# Patient Record
Sex: FEMALE | Race: WHITE | NOT HISPANIC OR LATINO | Employment: STUDENT | URBAN - METROPOLITAN AREA
[De-identification: names, ages, dates, MRNs, and addresses within clinical notes are randomized per-mention and may not be internally consistent; named-entity substitution may affect disease eponyms.]

---

## 2023-04-03 DIAGNOSIS — N63.22 MASS OF UPPER INNER QUADRANT OF LEFT BREAST: ICD-10-CM

## 2023-04-03 DIAGNOSIS — Z12.39 ENCOUNTER FOR SCREENING FOR MALIGNANT NEOPLASM OF BREAST, UNSPECIFIED SCREENING MODALITY: ICD-10-CM

## 2023-04-03 DIAGNOSIS — D22.9 MELANOCYTIC NEVUS, UNSPECIFIED LOCATION: ICD-10-CM

## 2023-04-03 DIAGNOSIS — N63.32 UNSPECIFIED LUMP IN AXILLARY TAIL OF THE LEFT BREAST: Primary | ICD-10-CM

## 2023-04-12 ENCOUNTER — HOSPITAL ENCOUNTER (OUTPATIENT)
Dept: RADIOLOGY | Facility: HOSPITAL | Age: 58
Discharge: HOME | End: 2023-04-12
Payer: COMMERCIAL

## 2023-04-12 VITALS — BODY MASS INDEX: 26.83 KG/M2 | WEIGHT: 177 LBS | HEIGHT: 68 IN

## 2023-04-12 DIAGNOSIS — N63.22 MASS OF UPPER INNER QUADRANT OF LEFT BREAST: ICD-10-CM

## 2023-04-12 DIAGNOSIS — N63.32 UNSPECIFIED LUMP IN AXILLARY TAIL OF THE LEFT BREAST: ICD-10-CM

## 2023-04-12 DIAGNOSIS — D22.9 MELANOCYTIC NEVUS, UNSPECIFIED LOCATION: ICD-10-CM

## 2023-04-12 DIAGNOSIS — Z12.39 ENCOUNTER FOR SCREENING FOR MALIGNANT NEOPLASM OF BREAST, UNSPECIFIED SCREENING MODALITY: ICD-10-CM

## 2023-04-12 PROCEDURE — 77066 DX MAMMO INCL CAD BI: CPT | Mod: 26,,, | Performed by: RADIOLOGY

## 2023-04-12 PROCEDURE — 77062 BREAST TOMOSYNTHESIS BI: CPT | Mod: 26,,, | Performed by: RADIOLOGY

## 2023-04-12 PROCEDURE — 77062 MAMMO DIGITAL DIAGNOSTIC BILAT WITH TOMO: ICD-10-PCS | Mod: 26,,, | Performed by: RADIOLOGY

## 2023-04-12 PROCEDURE — 77062 BREAST TOMOSYNTHESIS BI: CPT | Mod: TC

## 2023-04-12 PROCEDURE — 77066 MAMMO DIGITAL DIAGNOSTIC BILAT WITH TOMO: ICD-10-PCS | Mod: 26,,, | Performed by: RADIOLOGY

## 2023-07-22 ENCOUNTER — HOSPITAL ENCOUNTER (OUTPATIENT)
Dept: RADIOLOGY | Facility: OTHER | Age: 58
Discharge: HOME | End: 2023-07-22
Attending: FAMILY MEDICINE
Payer: COMMERCIAL

## 2023-07-22 DIAGNOSIS — R10.2 PELVIC AND PERINEAL PAIN: ICD-10-CM

## 2023-07-22 PROCEDURE — 76830 US TRANSVAGINAL NON OB: ICD-10-PCS | Mod: 26,,, | Performed by: RADIOLOGY

## 2023-07-22 PROCEDURE — 76700 US EXAM ABDOM COMPLETE: CPT | Mod: 26,,, | Performed by: RADIOLOGY

## 2023-07-22 PROCEDURE — 76700 US ABDOMEN COMPLETE: ICD-10-PCS | Mod: 26,,, | Performed by: RADIOLOGY

## 2023-07-22 PROCEDURE — 76830 TRANSVAGINAL US NON-OB: CPT | Mod: 26,,, | Performed by: RADIOLOGY

## 2023-07-22 PROCEDURE — 76700 US EXAM ABDOM COMPLETE: CPT | Mod: TC

## 2023-07-22 PROCEDURE — 76830 TRANSVAGINAL US NON-OB: CPT | Mod: TC

## 2023-09-29 ENCOUNTER — OFFICE VISIT (OUTPATIENT)
Dept: OBSTETRICS AND GYNECOLOGY | Facility: CLINIC | Age: 58
End: 2023-09-29
Payer: COMMERCIAL

## 2023-09-29 VITALS
WEIGHT: 191.81 LBS | SYSTOLIC BLOOD PRESSURE: 154 MMHG | HEART RATE: 55 BPM | BODY MASS INDEX: 29.16 KG/M2 | DIASTOLIC BLOOD PRESSURE: 89 MMHG

## 2023-09-29 DIAGNOSIS — R93.89 THICKENED ENDOMETRIUM: Primary | ICD-10-CM

## 2023-09-29 LAB
B-HCG UR QL: NEGATIVE
CTP QC/QA: YES

## 2023-09-29 PROCEDURE — 88305 TISSUE EXAM BY PATHOLOGIST: CPT | Mod: 26,,, | Performed by: PATHOLOGY

## 2023-09-29 PROCEDURE — 99999 PR PBB SHADOW E&M-EST. PATIENT-LVL III: ICD-10-PCS | Mod: PBBFAC,,, | Performed by: OBSTETRICS & GYNECOLOGY

## 2023-09-29 PROCEDURE — 3079F DIAST BP 80-89 MM HG: CPT | Mod: CPTII,S$GLB,, | Performed by: OBSTETRICS & GYNECOLOGY

## 2023-09-29 PROCEDURE — 3008F BODY MASS INDEX DOCD: CPT | Mod: CPTII,S$GLB,, | Performed by: OBSTETRICS & GYNECOLOGY

## 2023-09-29 PROCEDURE — 58100 ENDOMETRIAL BIOPSY: ICD-10-PCS | Mod: S$GLB,,, | Performed by: OBSTETRICS & GYNECOLOGY

## 2023-09-29 PROCEDURE — 99999 PR PBB SHADOW E&M-EST. PATIENT-LVL III: CPT | Mod: PBBFAC,,, | Performed by: OBSTETRICS & GYNECOLOGY

## 2023-09-29 PROCEDURE — 1159F PR MEDICATION LIST DOCUMENTED IN MEDICAL RECORD: ICD-10-PCS | Mod: CPTII,S$GLB,, | Performed by: OBSTETRICS & GYNECOLOGY

## 2023-09-29 PROCEDURE — 58100 BIOPSY OF UTERUS LINING: CPT | Mod: S$GLB,,, | Performed by: OBSTETRICS & GYNECOLOGY

## 2023-09-29 PROCEDURE — 81025 POCT URINE PREGNANCY: ICD-10-PCS | Mod: S$GLB,,, | Performed by: OBSTETRICS & GYNECOLOGY

## 2023-09-29 PROCEDURE — 88305 TISSUE EXAM BY PATHOLOGIST: ICD-10-PCS | Mod: 26,,, | Performed by: PATHOLOGY

## 2023-09-29 PROCEDURE — 3077F PR MOST RECENT SYSTOLIC BLOOD PRESSURE >= 140 MM HG: ICD-10-PCS | Mod: CPTII,S$GLB,, | Performed by: OBSTETRICS & GYNECOLOGY

## 2023-09-29 PROCEDURE — 1159F MED LIST DOCD IN RCRD: CPT | Mod: CPTII,S$GLB,, | Performed by: OBSTETRICS & GYNECOLOGY

## 2023-09-29 PROCEDURE — 99203 OFFICE O/P NEW LOW 30 MIN: CPT | Mod: 25,S$GLB,, | Performed by: OBSTETRICS & GYNECOLOGY

## 2023-09-29 PROCEDURE — 88305 TISSUE EXAM BY PATHOLOGIST: CPT | Performed by: PATHOLOGY

## 2023-09-29 PROCEDURE — 81025 URINE PREGNANCY TEST: CPT | Mod: S$GLB,,, | Performed by: OBSTETRICS & GYNECOLOGY

## 2023-09-29 PROCEDURE — 3077F SYST BP >= 140 MM HG: CPT | Mod: CPTII,S$GLB,, | Performed by: OBSTETRICS & GYNECOLOGY

## 2023-09-29 PROCEDURE — 99203 PR OFFICE/OUTPT VISIT, NEW, LEVL III, 30-44 MIN: ICD-10-PCS | Mod: 25,S$GLB,, | Performed by: OBSTETRICS & GYNECOLOGY

## 2023-09-29 PROCEDURE — 3008F PR BODY MASS INDEX (BMI) DOCUMENTED: ICD-10-PCS | Mod: CPTII,S$GLB,, | Performed by: OBSTETRICS & GYNECOLOGY

## 2023-09-29 PROCEDURE — 3079F PR MOST RECENT DIASTOLIC BLOOD PRESSURE 80-89 MM HG: ICD-10-PCS | Mod: CPTII,S$GLB,, | Performed by: OBSTETRICS & GYNECOLOGY

## 2023-09-29 NOTE — PROCEDURES
Endometrial biopsy    Date/Time: 9/29/2023 1:45 PM    Performed by: Radha Becker MD  Authorized by: Radha Becker MD    Consent:     Consent obtained:  Written    Consent given by:  Patient    Patient questions answered: yes      Patient agrees, verbalizes understanding, and wants to proceed: yes    Indication:     Indications: Other disorder of menstruation and other abnormal bleeding from female genital tract    Pre-procedure:     Pre-procedure timeout performed: yes    Procedure:     Procedure: endometrial biopsy with Pipelle      Cervix cleaned and prepped: yes      The cervix was dilated: yes      Uterus sounded: yes      Uterus sound depth (cm):  6    Specimen collected: specimen collected and sent to pathology      Specimen collected comment:  Concern for insufficient sample      Radha Becker

## 2023-09-29 NOTE — PROGRESS NOTES
Gynecology    SUBJECTIVE:     Chief Complaint: Follow-up       History of Present Illness:  58 year old who presents for follow up of an ultrasound showing a thickened endometrial stripe.  She was having abdominal bloating and discomfort.  She had not had any vaginal bleeding.  Her bloating and discomfort has resolved since then, but when she was complaining about it, her primary care doctor ordered an abdominal and a pelvic ultrasound.  The ovaries were normal, but the EM stripe was 10 mm.  She is not and was not constipated when she had the discomfort.  She had a colonoscopy last year.     Review of Systems:  Review of Systems   Constitutional:  Negative for appetite change, fever and unexpected weight change.   Gastrointestinal:  Negative for abdominal pain, bloating, constipation, diarrhea, nausea and vomiting.   Genitourinary:  Negative for menorrhagia, menstrual problem, pelvic pain, vaginal bleeding, vaginal discharge, vaginal pain and vaginal odor.        OBJECTIVE:     Physical Exam:  Physical Exam  Vitals and nursing note reviewed.   Constitutional:       Appearance: She is well-developed.   Pulmonary:      Effort: Pulmonary effort is normal.   Abdominal:      Palpations: Abdomen is soft.   Genitourinary:     Labia:         Right: No rash, tenderness, lesion or injury.         Left: No rash, tenderness, lesion or injury.       Vagina: Normal. No signs of injury and foreign body. No vaginal discharge, erythema, tenderness or bleeding.      Cervix: No cervical motion tenderness, discharge or friability.      Uterus: Not deviated, not enlarged, not fixed and not tender.       Adnexa:         Right: No mass, tenderness or fullness.          Left: No mass, tenderness or fullness.        Comments: Urethra: normal appearing urethra with no masses, tenderness or lesions  Urethral meatus: normal size, anterior vaginal wall with no prolapse, no lesions  Neurological:      Mental Status: She is alert and oriented to  person, place, and time.   Psychiatric:         Behavior: Behavior normal.         Thought Content: Thought content normal.         Judgment: Judgment normal.         Chaperoned by: Valerie    ASSESSMENT:       ICD-10-CM ICD-9-CM    1. Thickened endometrium  R93.89 793.5              Plan:      Morro was seen today for follow-up.    Diagnoses and all orders for this visit:    Thickened endometrium    - ultrasound demonstrating incidental finding of thickened endometrium (10 mm); patient is otherwise asymptomatic currently  - uterine biopsy performed today    No orders of the defined types were placed in this encounter.        Radha Becker

## 2023-10-03 LAB
FINAL PATHOLOGIC DIAGNOSIS: NORMAL
GROSS: NORMAL
Lab: NORMAL

## 2024-03-25 ENCOUNTER — OFFICE VISIT (OUTPATIENT)
Dept: CARDIOLOGY | Facility: CLINIC | Age: 59
End: 2024-03-25
Payer: COMMERCIAL

## 2024-03-25 VITALS
BODY MASS INDEX: 30.41 KG/M2 | WEIGHT: 200 LBS | SYSTOLIC BLOOD PRESSURE: 122 MMHG | HEART RATE: 61 BPM | DIASTOLIC BLOOD PRESSURE: 82 MMHG | OXYGEN SATURATION: 98 %

## 2024-03-25 DIAGNOSIS — R07.2 PRECORDIAL PAIN: Primary | ICD-10-CM

## 2024-03-25 PROCEDURE — 3008F BODY MASS INDEX DOCD: CPT | Mod: CPTII,S$GLB,, | Performed by: INTERNAL MEDICINE

## 2024-03-25 PROCEDURE — 93005 ELECTROCARDIOGRAM TRACING: CPT

## 2024-03-25 PROCEDURE — 93010 ELECTROCARDIOGRAM REPORT: CPT | Mod: S$GLB,,, | Performed by: INTERNAL MEDICINE

## 2024-03-25 PROCEDURE — 99999 PR PBB SHADOW E&M-EST. PATIENT-LVL III: CPT | Mod: PBBFAC,,, | Performed by: INTERNAL MEDICINE

## 2024-03-25 PROCEDURE — 1160F RVW MEDS BY RX/DR IN RCRD: CPT | Mod: CPTII,S$GLB,, | Performed by: INTERNAL MEDICINE

## 2024-03-25 PROCEDURE — 99204 OFFICE O/P NEW MOD 45 MIN: CPT | Mod: 25,S$GLB,, | Performed by: INTERNAL MEDICINE

## 2024-03-25 PROCEDURE — 3074F SYST BP LT 130 MM HG: CPT | Mod: CPTII,S$GLB,, | Performed by: INTERNAL MEDICINE

## 2024-03-25 PROCEDURE — 3079F DIAST BP 80-89 MM HG: CPT | Mod: CPTII,S$GLB,, | Performed by: INTERNAL MEDICINE

## 2024-03-25 PROCEDURE — 1159F MED LIST DOCD IN RCRD: CPT | Mod: CPTII,S$GLB,, | Performed by: INTERNAL MEDICINE

## 2024-03-25 NOTE — PROGRESS NOTES
[facility]    Chief Complaint  Chief Complaint   Patient presents with    Chest Pain       HPI:    Presents for evaluation of chest pain.  First occurred about 2 weeks ago while she was exercising.  Prior to that, typically exercise without any limitations.  That day she developed a left precordial ache.  Radiated to the left side of her neck into her left axilla and left scapular area.  Felt lightheaded.  No nausea, vomiting, or diaphoresis.  Since then the discomfort has persisted.  It waxes and wanes.  Gets worse with activities.  Has not exercise since that time.    Medications  No current outpatient medications on file.     No current facility-administered medications for this visit.        History  History reviewed. No pertinent past medical history.  Past Surgical History:   Procedure Laterality Date    CERVICAL BIOPSY  W/ LOOP ELECTRODE EXCISION  2019     Social History     Socioeconomic History    Marital status: Significant Other   Tobacco Use    Smoking status: Former     Types: Cigarettes     Passive exposure: Never    Smokeless tobacco: Never   Substance and Sexual Activity    Alcohol use: Yes     Social Determinants of Health     Financial Resource Strain: Low Risk  (3/22/2024)    Overall Financial Resource Strain (CARDIA)     Difficulty of Paying Living Expenses: Not hard at all   Food Insecurity: No Food Insecurity (3/22/2024)    Hunger Vital Sign     Worried About Running Out of Food in the Last Year: Never true     Ran Out of Food in the Last Year: Never true   Transportation Needs: No Transportation Needs (3/22/2024)    PRAPARE - Transportation     Lack of Transportation (Medical): No     Lack of Transportation (Non-Medical): No   Physical Activity: Sufficiently Active (3/22/2024)    Exercise Vital Sign     Days of Exercise per Week: 7 days     Minutes of Exercise per Session: 30 min   Stress: Stress Concern Present (3/22/2024)    [place] [employer] -  Occupational Stress Questionnaire     Feeling of Stress : To some extent   Social Connections: Unknown (3/22/2024)    Social Connection and Isolation Panel [NHANES]     Frequency of Communication with Friends and Family: Once a week     Frequency of Social Gatherings with Friends and Family: Twice a week     Active Member of Clubs or Organizations: No     Attends Club or Organization Meetings: Patient declined     Marital Status:    Housing Stability: Low Risk  (3/22/2024)    Housing Stability Vital Sign     Unable to Pay for Housing in the Last Year: No     Number of Places Lived in the Last Year: 1     Unstable Housing in the Last Year: No     Family History   Problem Relation Age of Onset    Atrial fibrillation Mother         Allergies  Review of patient's allergies indicates:  No Known Allergies    Review of Systems   Review of Systems   Constitutional: Negative for malaise/fatigue, weight gain and weight loss.   Eyes:  Negative for visual disturbance.   Cardiovascular:  Positive for chest pain. Negative for claudication, cyanosis, dyspnea on exertion, irregular heartbeat, leg swelling, near-syncope, orthopnea, palpitations, paroxysmal nocturnal dyspnea and syncope.   Respiratory:  Negative for cough, hemoptysis, shortness of breath, sleep disturbances due to breathing and wheezing.    Hematologic/Lymphatic: Negative for bleeding problem. Does not bruise/bleed easily.   Skin:  Negative for poor wound healing.   Musculoskeletal:  Negative for muscle cramps and myalgias.   Gastrointestinal:  Negative for abdominal pain, anorexia, diarrhea, heartburn, hematemesis, hematochezia, melena, nausea and vomiting.   Genitourinary:  Negative for hematuria and nocturia.   Neurological:  Negative for excessive daytime sleepiness, dizziness, focal weakness, light-headedness and weakness.       Physical Exam  Vitals:    03/25/24 0930   BP: 122/82   Pulse: 61     Wt Readings from Last 1 Encounters:   03/25/24 90.7 kg  (200 lb)     Physical Exam  Vitals and nursing note reviewed.   Constitutional:       General: She is not in acute distress.     Appearance: She is not toxic-appearing or diaphoretic.   HENT:      Head: Normocephalic and atraumatic.      Mouth/Throat:      Lips: Pink.      Mouth: Mucous membranes are moist.   Eyes:      General: No scleral icterus.     Conjunctiva/sclera: Conjunctivae normal.   Neck:      Thyroid: No thyromegaly.      Vascular: No carotid bruit, hepatojugular reflux or JVD.      Trachea: Trachea normal.   Cardiovascular:      Rate and Rhythm: Normal rate and regular rhythm. No extrasystoles are present.     Chest Wall: PMI is not displaced.      Pulses:           Carotid pulses are 2+ on the right side and 2+ on the left side.       Radial pulses are 2+ on the right side and 2+ on the left side.        Dorsalis pedis pulses are 2+ on the right side and 2+ on the left side.        Posterior tibial pulses are 2+ on the right side and 2+ on the left side.      Heart sounds: S1 normal and S2 normal. No murmur heard.     No friction rub. No S3 or S4 sounds.   Pulmonary:      Effort: Pulmonary effort is normal. No accessory muscle usage or respiratory distress.      Breath sounds: Normal breath sounds and air entry. No decreased breath sounds, wheezing, rhonchi or rales.   Abdominal:      General: Bowel sounds are normal. There is no distension or abdominal bruit.      Palpations: Abdomen is soft. There is no hepatomegaly, splenomegaly or pulsatile mass.      Tenderness: There is no abdominal tenderness.   Musculoskeletal:         General: No tenderness or deformity.      Right lower leg: No edema.      Left lower leg: No edema.   Skin:     General: Skin is warm and dry.      Capillary Refill: Capillary refill takes less than 2 seconds.      Coloration: Skin is not cyanotic or pale.      Nails: There is no clubbing.   Neurological:      General: No focal deficit present.      Mental Status: She is alert  and oriented to person, place, and time.   Psychiatric:         Attention and Perception: Attention normal.         Mood and Affect: Mood normal.         Speech: Speech normal.         Behavior: Behavior normal. Behavior is cooperative.         Labs  Office Visit on 09/29/2023   Component Date Value Ref Range Status    POC Preg Test, Ur 09/29/2023 Negative  Negative Final     Acceptable 09/29/2023 Yes   Final    Final Pathologic Diagnosis 09/29/2023 Small fragments of inactive endometrium admixed with blood clot and mucus, no atypical hyperplasia or malignancy identified.   Final    Interp By Eran Alvares M.D., Signed on 10/03/2023 at 12:49    Gross 09/29/2023    Final                    Value:Pathology ID:  [id number]  Patient ID:  [id number]     The specimen is received in formalin labeled &quot;endometrium&quot; per requisition.  The specimen consists of multiple tan-white fragments of soft tissue admixed with semitranslucent, tan, mucoid material measuring 0.7 x 0.6 x 0.4 cm in aggregate.  The   specimen is submitted entirely in cassette [id number].    Lou Peterson, MS  Grossing Technologist      Disclaimer 09/29/2023 Unless the case is a 'gross only' or additional testing only, the final diagnosis for each specimen is based on a microscopic examination of appropriate tissue sections.   Final       Imaging  No results found.    Assessment  1. Precordial pain  Exertional component makes it concerning for cardiac etiology.  - IN OFFICE EKG 12-LEAD (to Muse)  - Exercise Stress - EKG; Future      Plan and Discussion    Workup as above with further planning based on those results.    The ASCVD Risk score (Hardik DK, et al., 2019) failed to calculate for the following reasons:    Cannot find a previous HDL lab    Cannot find a previous total cholesterol lab     Follow Up  Follow up for test results.      Javad Moya MD

## 2024-03-26 ENCOUNTER — HOSPITAL ENCOUNTER (OUTPATIENT)
Dept: CARDIOLOGY | Facility: OTHER | Age: 59
Discharge: HOME | End: 2024-03-26
Attending: INTERNAL MEDICINE
Payer: COMMERCIAL

## 2024-03-26 VITALS
HEIGHT: 68 IN | WEIGHT: 140 LBS | SYSTOLIC BLOOD PRESSURE: 115 MMHG | BODY MASS INDEX: 21.22 KG/M2 | DIASTOLIC BLOOD PRESSURE: 67 MMHG | HEART RATE: 57 BPM

## 2024-03-26 DIAGNOSIS — R07.2 PRECORDIAL PAIN: ICD-10-CM

## 2024-03-26 LAB
CV STRESS BASE HR: 57 BPM
DIASTOLIC BLOOD PRESSURE: 67 MMHG
OHS CV CPX 85 PERCENT MAX PREDICTED HEART RATE MALE: 138
OHS CV CPX ESTIMATED METS: 13
OHS CV CPX MAX PREDICTED HEART RATE: 162
OHS CV CPX PATIENT IS FEMALE: 1
OHS CV CPX PATIENT IS MALE: 0
OHS CV CPX PEAK DIASTOLIC BLOOD PRESSURE: 78 MMHG
OHS CV CPX PEAK HEAR RATE: 155 BPM
OHS CV CPX PEAK RATE PRESSURE PRODUCT: NORMAL
OHS CV CPX PEAK SYSTOLIC BLOOD PRESSURE: 202 MMHG
OHS CV CPX PERCENT MAX PREDICTED HEART RATE ACHIEVED: 100
OHS CV CPX RATE PRESSURE PRODUCT PRESENTING: 6555
OHS QRS DURATION: 90 MS
OHS QTC CALCULATION: 392 MS
STRESS ECHO POST EXERCISE DUR MIN: 9 MINUTES
STRESS ECHO POST EXERCISE DUR SEC: 59 SECONDS
SYSTOLIC BLOOD PRESSURE: 115 MMHG

## 2024-03-26 PROCEDURE — 93018 CV STRESS TEST I&R ONLY: CPT | Mod: ,,, | Performed by: INTERNAL MEDICINE

## 2024-03-26 PROCEDURE — 93016 CV STRESS TEST SUPVJ ONLY: CPT | Mod: ,,, | Performed by: INTERNAL MEDICINE

## 2024-03-26 PROCEDURE — 93017 CV STRESS TEST TRACING ONLY: CPT

## 2024-04-16 ENCOUNTER — HOSPITAL ENCOUNTER (OUTPATIENT)
Dept: RADIOLOGY | Facility: OTHER | Age: 59
Discharge: HOME | End: 2024-04-16
Attending: FAMILY MEDICINE
Payer: COMMERCIAL

## 2024-04-16 DIAGNOSIS — Z12.31 ENCOUNTER FOR SCREENING MAMMOGRAM FOR MALIGNANT NEOPLASM OF BREAST: ICD-10-CM

## 2024-04-16 PROCEDURE — 77067 SCR MAMMO BI INCL CAD: CPT | Mod: TC

## 2024-04-16 PROCEDURE — 77063 BREAST TOMOSYNTHESIS BI: CPT | Mod: TC

## 2024-04-16 PROCEDURE — 77067 SCR MAMMO BI INCL CAD: CPT | Mod: 26,,, | Performed by: RADIOLOGY

## 2024-04-16 PROCEDURE — 77063 BREAST TOMOSYNTHESIS BI: CPT | Mod: 26,,, | Performed by: RADIOLOGY

## 2025-05-06 ENCOUNTER — PATIENT OUTREACH (OUTPATIENT)
Dept: ADMINISTRATIVE | Facility: HOSPITAL | Age: 60
End: 2025-05-06
Payer: COMMERCIAL

## 2025-05-07 ENCOUNTER — PATIENT MESSAGE (OUTPATIENT)
Dept: INTERNAL MEDICINE | Facility: CLINIC | Age: 60
End: 2025-05-07

## 2025-05-07 ENCOUNTER — OFFICE VISIT (OUTPATIENT)
Dept: INTERNAL MEDICINE | Facility: CLINIC | Age: 60
End: 2025-05-07
Payer: COMMERCIAL

## 2025-05-07 ENCOUNTER — LAB VISIT (OUTPATIENT)
Dept: LAB | Facility: OTHER | Age: 60
End: 2025-05-07
Attending: FAMILY MEDICINE
Payer: COMMERCIAL

## 2025-05-07 ENCOUNTER — TELEPHONE (OUTPATIENT)
Dept: INTERNAL MEDICINE | Facility: CLINIC | Age: 60
End: 2025-05-07

## 2025-05-07 ENCOUNTER — RESULTS FOLLOW-UP (OUTPATIENT)
Dept: INTERNAL MEDICINE | Facility: CLINIC | Age: 60
End: 2025-05-07

## 2025-05-07 VITALS
BODY MASS INDEX: 30.3 KG/M2 | HEART RATE: 64 BPM | SYSTOLIC BLOOD PRESSURE: 120 MMHG | DIASTOLIC BLOOD PRESSURE: 70 MMHG | HEIGHT: 68 IN | OXYGEN SATURATION: 99 % | WEIGHT: 199.94 LBS

## 2025-05-07 DIAGNOSIS — Z00.00 ANNUAL PHYSICAL EXAM: ICD-10-CM

## 2025-05-07 DIAGNOSIS — D22.9 MULTIPLE ATYPICAL SKIN MOLES: ICD-10-CM

## 2025-05-07 DIAGNOSIS — N95.1 MENOPAUSAL STATE: ICD-10-CM

## 2025-05-07 DIAGNOSIS — Z12.31 ENCOUNTER FOR SCREENING MAMMOGRAM FOR MALIGNANT NEOPLASM OF BREAST: ICD-10-CM

## 2025-05-07 DIAGNOSIS — Z00.00 ANNUAL PHYSICAL EXAM: Primary | ICD-10-CM

## 2025-05-07 DIAGNOSIS — F41.1 GENERALIZED ANXIETY DISORDER: ICD-10-CM

## 2025-05-07 LAB
ABSOLUTE EOSINOPHIL (OHS): 0.14 K/UL
ABSOLUTE MONOCYTE (OHS): 0.79 K/UL (ref 0.3–1)
ABSOLUTE NEUTROPHIL COUNT (OHS): 1.42 K/UL (ref 1.8–7.7)
ALBUMIN SERPL BCP-MCNC: 3.9 G/DL (ref 3.5–5.2)
ALP SERPL-CCNC: 54 UNIT/L (ref 40–150)
ALT SERPL W/O P-5'-P-CCNC: 43 UNIT/L (ref 10–44)
ANION GAP (OHS): 11 MMOL/L (ref 8–16)
AST SERPL-CCNC: 34 UNIT/L (ref 11–45)
BASOPHILS # BLD AUTO: 0.06 K/UL
BASOPHILS NFR BLD AUTO: 1.4 %
BILIRUB SERPL-MCNC: 0.3 MG/DL (ref 0.1–1)
BUN SERPL-MCNC: 16 MG/DL (ref 6–20)
CALCIUM SERPL-MCNC: 9.9 MG/DL (ref 8.7–10.5)
CHLORIDE SERPL-SCNC: 104 MMOL/L (ref 95–110)
CHOLEST SERPL-MCNC: 272 MG/DL (ref 120–199)
CHOLEST/HDLC SERPL: 4 {RATIO} (ref 2–5)
CO2 SERPL-SCNC: 26 MMOL/L (ref 23–29)
CREAT SERPL-MCNC: 0.8 MG/DL (ref 0.5–1.4)
EAG (OHS): 105 MG/DL (ref 68–131)
ERYTHROCYTE [DISTWIDTH] IN BLOOD BY AUTOMATED COUNT: 12.5 % (ref 11.5–14.5)
GFR SERPLBLD CREATININE-BSD FMLA CKD-EPI: >60 ML/MIN/1.73/M2
GLUCOSE SERPL-MCNC: 99 MG/DL (ref 70–110)
HBA1C MFR BLD: 5.3 % (ref 4–5.6)
HCT VFR BLD AUTO: 42.7 % (ref 37–48.5)
HDLC SERPL-MCNC: 68 MG/DL (ref 40–75)
HDLC SERPL: 25 % (ref 20–50)
HGB BLD-MCNC: 14.1 GM/DL (ref 12–16)
IMM GRANULOCYTES # BLD AUTO: 0.01 K/UL (ref 0–0.04)
IMM GRANULOCYTES NFR BLD AUTO: 0.2 % (ref 0–0.5)
LDLC SERPL CALC-MCNC: 176.8 MG/DL (ref 63–159)
LYMPHOCYTES # BLD AUTO: 1.92 K/UL (ref 1–4.8)
MCH RBC QN AUTO: 31.8 PG (ref 27–31)
MCHC RBC AUTO-ENTMCNC: 33 G/DL (ref 32–36)
MCV RBC AUTO: 96 FL (ref 82–98)
NONHDLC SERPL-MCNC: 204 MG/DL
NUCLEATED RBC (/100WBC) (OHS): 0 /100 WBC
PLATELET # BLD AUTO: 290 K/UL (ref 150–450)
PMV BLD AUTO: 8.8 FL (ref 9.2–12.9)
POTASSIUM SERPL-SCNC: 4.7 MMOL/L (ref 3.5–5.1)
PROT SERPL-MCNC: 7.9 GM/DL (ref 6–8.4)
RBC # BLD AUTO: 4.43 M/UL (ref 4–5.4)
RELATIVE EOSINOPHIL (OHS): 3.2 %
RELATIVE LYMPHOCYTE (OHS): 44.2 % (ref 18–48)
RELATIVE MONOCYTE (OHS): 18.2 % (ref 4–15)
RELATIVE NEUTROPHIL (OHS): 32.8 % (ref 38–73)
SODIUM SERPL-SCNC: 141 MMOL/L (ref 136–145)
T4 FREE SERPL-MCNC: 0.88 NG/DL (ref 0.71–1.51)
T4 FREE SERPL-MCNC: 0.88 NG/DL (ref 0.71–1.51)
TRIGL SERPL-MCNC: 136 MG/DL (ref 30–150)
TSH SERPL-ACNC: 3.62 UIU/ML (ref 0.4–4)
WBC # BLD AUTO: 4.34 K/UL (ref 3.9–12.7)

## 2025-05-07 PROCEDURE — 84443 ASSAY THYROID STIM HORMONE: CPT

## 2025-05-07 PROCEDURE — 83036 HEMOGLOBIN GLYCOSYLATED A1C: CPT

## 2025-05-07 PROCEDURE — 99999 PR PBB SHADOW E&M-EST. PATIENT-LVL IV: CPT | Mod: PBBFAC,,, | Performed by: FAMILY MEDICINE

## 2025-05-07 PROCEDURE — 85025 COMPLETE CBC W/AUTO DIFF WBC: CPT

## 2025-05-07 PROCEDURE — 80053 COMPREHEN METABOLIC PANEL: CPT

## 2025-05-07 PROCEDURE — 36415 COLL VENOUS BLD VENIPUNCTURE: CPT

## 2025-05-07 PROCEDURE — 80061 LIPID PANEL: CPT

## 2025-05-07 RX ORDER — LORAZEPAM 1 MG/1
1 TABLET ORAL EVERY 6 HOURS PRN
COMMUNITY

## 2025-05-07 RX ORDER — ESTRADIOL 0.1 MG/G
CREAM VAGINAL DAILY
COMMUNITY

## 2025-05-07 NOTE — PROGRESS NOTES
Subjective:      Patient ID: Morro Madrigal is a 59 y.o. female.    Chief Complaint: Est Care (Forehead need to be check )      History of Present Illness    CHIEF COMPLAINT:  - Ms. Madrigal presents for an annual wellness visit and physical exam.    HPI:  Ms. Madrigal reports using vaginal estrogen. She's looking to establish care with a gynecologist and asks for a referral.     She has taken one lorazepam in the last 6 months for anxiety and sleep, which she uses infrequently. Her anxiety is very manageable. She uses the Calm abhay for sleep, which is effective. She notes that she gets anxious while traveling. She's planning a trip to [place] in the next few months.    She currently has mild sinus congestion. She reports a healing scrape from a fire ant bite, which caused a significant reaction. She notes a persistent lump on her forehead, which she desires to have examined by a dermatologist.    She denies chest pain, trouble breathing, constipation, diarrhea, and persistent bleeding in stool. She denies having any allergies to medicines that she is aware of.    MEDICATIONS:  - Vaginal estrogen  - Lorazepam, used infrequently for anxiety and sleep (one dose in the last 6 months)    ALLERGIES:  - No known drug allergies    MEDICAL HISTORY:  - Depression: Past history, currently doing well  - Contraception: current vaginal estrogen  - History of abnormal Pap: Yes, two LEEPs previously    SURGICAL HISTORY:  - LEEP (Loop Electrosurgical Excision Procedure): Two procedures    FAMILY HISTORY:  - Mother: Atrial fibrillation, giant cell arteritis with vision loss in one eye  - Maternal aunt: Giant cell arteritis ()  - Father:  of lung cancer in his 60s, approximately 20 years ago  - Maternal grandfather:  of heart attack at age 62    SOCIAL HISTORY:  - Alcohol: Glass of wine nightly, cocktail or two once a week  Smoking: Former smoker, started at age 16 in , quit at age 26 in   Denies current drug use  -  "Occupation: Architectural historian, former          Problem List[1]     Current Medications[2]  Review of patient's allergies indicates:  No Known Allergies    Past Surgical History:   Procedure Laterality Date    CERVICAL BIOPSY  W/ LOOP ELECTRODE EXCISION  2019        Family History   Problem Relation Name Age of Onset    Atrial fibrillation Mother      Other (giant cell arteritis) Mother      Lung cancer Father      Other (giant cell arteritis) Maternal Aunt      Coronary artery disease Maternal Grandfather          Social History[3]     Lab Results   Component Value Date     05/07/2025    K 4.7 05/07/2025     05/07/2025    CO2 26 05/07/2025    GLU 99 05/07/2025    BUN 16 05/07/2025    CREATININE 0.8 05/07/2025    CALCIUM 9.9 05/07/2025    PROT 7.9 05/07/2025    ALBUMIN 3.9 05/07/2025    BILITOT 0.3 05/07/2025    ALKPHOS 54 05/07/2025    AST 34 05/07/2025    ALT 43 05/07/2025    ANIONGAP 11 05/07/2025    WBC 4.34 05/07/2025    HGB 14.1 05/07/2025    HCT 42.7 05/07/2025    MCV 96 05/07/2025     05/07/2025    TSH 3.619 05/07/2025       Lab Results   Component Value Date    HGBA1C 5.6 07/16/2024     No results found for: "MICALBCREAT"  No results found for: "LDLCALC", "CHOL", "HDL", "TRIG"    Review of Systems   Constitutional:  Negative for appetite change, chills, fever and unexpected weight change.   HENT:  Negative for ear pain, sinus pressure/congestion and sore throat.    Eyes:  Negative for visual disturbance.   Respiratory:  Negative for shortness of breath and wheezing.    Cardiovascular:  Negative for chest pain, palpitations and leg swelling.   Gastrointestinal:  Negative for abdominal pain, blood in stool, change in bowel habit, constipation, diarrhea and vomiting.   Genitourinary:  Negative for dysuria and hematuria.   Musculoskeletal:  Negative for arthralgias.   Integumentary:  Negative for rash.   Neurological:  Negative for dizziness and headaches.   Psychiatric/Behavioral: " " Negative for depressed mood and sleep disturbance.         Vitals:    05/07/25 1109   BP: 120/70   Pulse: 64   SpO2: 99%   Weight: 90.7 kg (199 lb 15.3 oz)   Height: 5' 8" (1.727 m)   PainSc: 0-No pain     Objective:   Physical Exam    General: Pleasant. No acute distress. Well-developed. Well-nourished.  Eyes: EOMBI. Sclerae anicteric.  HENT: Normocephalic. Atraumatic. Nares patent. Moist oral mucosa.  Cardiovascular: Regular rate and rhythm. No murmurs. No gallops. No rubs. Normal S1 and S2.  Respiratory: Normal respiratory effort. Clear to auscultation bilaterally. No rales. No rhonchi. No wheezing.  Abdomen: Soft. Nontender. Nondistended. Normoactive bowel sounds.  Musculoskeletal: No obvious deformity. Normal range of motion.  Extremities: No lower extremity edema.  Neurological: Alert and lucid. Normal gait. No gross deficits.  Psychiatric: Normal mood. Normal affect. Good insight. Good judgment.  Skin: Warm. Intact.        Assessment/Plan       ICD-10-CM ICD-9-CM   1. Annual physical exam  Z00.00 V70.0   2. Multiple atypical skin moles  D22.9 216.9   3. Generalized anxiety disorder  F41.1 300.02   4. Encounter for screening mammogram for malignant neoplasm of breast  Z12.31 V76.12   5. Menopausal state  N95.1 627.2        Assessment & Plan     Reviewed medical history, including family history of a-fib, giant cell arteritis, and early heart attack.   Noted history of LEEPs and past smoking, quit in 1991.   Depression history noted, patient reports currently doing well.   Examined patient, noting multiple moles.   Assessed for chest pain, breathing issues, or abdominal pain.   Noted healing fire ant bite and advised to avoid picking at it.   Recommend annual mammograms due to dense breast tissue.    PHYSICAL:  - Physical performed. Obtain labs as noted. Discussed age appropriate health screening and immunizations.    ANXIETY DISORDER:  - Continue lorazepam for occasional use in anxiety.  - Reviewed  and " patient notes that she has the majority of her last prescription left.       Annual physical exam  -     Mammo Digital Screening Bilat w/ Germán (XPD); Future; Expected date: 05/14/2025  -     Hemoglobin A1C; Future  -     CBC Auto Differential; Future  -     Comprehensive Metabolic Panel; Future  -     Lipid Panel; Future  -     T4, Free; Future  -     TSH; Future    Multiple atypical skin moles  -     Ambulatory referral/consult to Dermatology; Future; Expected date: 05/14/2025    Generalized anxiety disorder    Encounter for screening mammogram for malignant neoplasm of breast  -     Mammo Digital Screening Bilat w/ Germán (XPD); Future; Expected date: 05/14/2025    Menopausal state  -     Ambulatory referral/consult to Gynecology; Future; Expected date: 05/14/2025           Chronic conditions status updated as per HPI.  Other than changes above, continue current medications and maintain follow up with specialists.      Follow up in about 6 months (around 11/7/2025) for f/u anxiety.         Mavis Trujillo MD  [facility]    This note was generated with the assistance of ambient listening technology. Verbal consent was obtained by the patient and accompanying visitor(s) for the recording of patient appointment to facilitate this note. I attest to having reviewed and edited the generated note for accuracy, though some syntax or spelling errors may persist. Please contact the author of this note for any clarification.       There are no Patient Instructions on file for this visit.  Tests to Keep You Healthy    Mammogram: SCHEDULED  Colon Cancer Screening: Met on 7/20/2022  Cervical Cancer Screening: Met on 7/30/2024      Immunization History   Administered Date(s) Administered    COVID-19, MRNA, LN-S, PF (Pfizer) (Purple Cap) 05/05/2021, 05/28/2021                                     [1]   Patient Active Problem List  Diagnosis    Generalized anxiety disorder   [2]   Current Outpatient Medications:      estradioL (ESTRACE) 0.01 % (0.1 mg/gram) vaginal cream, Place vaginally once daily., Disp: , Rfl:     LORazepam (ATIVAN) 1 MG tablet, Take 1 mg by mouth every 6 (six) hours as needed for Anxiety., Disp: , Rfl:   [3]   Social History  Socioeconomic History    Marital status: Significant Other   Tobacco Use    Smoking status: Former     Types: Cigarettes     Start date:      Quit date:      Years since quittin.3     Passive exposure: Never    Smokeless tobacco: Never   Substance and Sexual Activity    Alcohol use: Yes     Comment: glass of wine nightly    Drug use: Never     Social Drivers of Health     Financial Resource Strain: Low Risk  (2025)    Overall Financial Resource Strain (CARDIA)     Difficulty of Paying Living Expenses: Not hard at all   Food Insecurity: No Food Insecurity (2025)    Hunger Vital Sign     Worried About Running Out of Food in the Last Year: Never true     Ran Out of Food in the Last Year: Never true   Transportation Needs: No Transportation Needs (2025)    PRAPARE - Transportation     Lack of Transportation (Medical): No     Lack of Transportation (Non-Medical): No   Physical Activity: Sufficiently Active (2025)    Exercise Vital Sign     Days of Exercise per Week: 7 days     Minutes of Exercise per Session: 60 min   Stress: No Stress Concern Present (2025)    [employer] [place] [employer] - Occupational Stress Questionnaire     Feeling of Stress : Only a little   Housing Stability: Low Risk  (2025)    Housing Stability Vital Sign     Unable to Pay for Housing in the Last Year: No     Number of Times Moved in the Last Year: 0     Homeless in the Last Year: No

## 2025-05-07 NOTE — TELEPHONE ENCOUNTER
Called to reschedule patient appointment due to bad weather Per Dr. Trujillo . Message left for patient to return my call.   
LGA protocol

## 2025-06-03 ENCOUNTER — HOSPITAL ENCOUNTER (OUTPATIENT)
Dept: RADIOLOGY | Facility: OTHER | Age: 60
Discharge: HOME | End: 2025-06-03
Attending: FAMILY MEDICINE
Payer: COMMERCIAL

## 2025-06-03 DIAGNOSIS — Z12.31 ENCOUNTER FOR SCREENING MAMMOGRAM FOR MALIGNANT NEOPLASM OF BREAST: ICD-10-CM

## 2025-06-03 DIAGNOSIS — Z00.00 ANNUAL PHYSICAL EXAM: ICD-10-CM

## 2025-06-03 PROCEDURE — 77067 SCR MAMMO BI INCL CAD: CPT | Mod: 26,,, | Performed by: RADIOLOGY

## 2025-06-03 PROCEDURE — 77063 BREAST TOMOSYNTHESIS BI: CPT | Mod: 26,,, | Performed by: RADIOLOGY

## 2025-06-03 PROCEDURE — 77063 BREAST TOMOSYNTHESIS BI: CPT | Mod: TC

## 2025-06-05 ENCOUNTER — OFFICE VISIT (OUTPATIENT)
Dept: OBSTETRICS AND GYNECOLOGY | Facility: CLINIC | Age: 60
End: 2025-06-05
Payer: COMMERCIAL

## 2025-06-05 VITALS
HEART RATE: 60 BPM | WEIGHT: 202.38 LBS | DIASTOLIC BLOOD PRESSURE: 81 MMHG | BODY MASS INDEX: 30.77 KG/M2 | SYSTOLIC BLOOD PRESSURE: 116 MMHG

## 2025-06-05 DIAGNOSIS — N95.1 MENOPAUSAL STATE: ICD-10-CM

## 2025-06-05 DIAGNOSIS — Z12.39 ENCOUNTER FOR SCREENING FOR MALIGNANT NEOPLASM OF BREAST, UNSPECIFIED SCREENING MODALITY: Primary | ICD-10-CM

## 2025-06-05 DIAGNOSIS — N89.8 VAGINAL DRYNESS: ICD-10-CM

## 2025-06-05 DIAGNOSIS — Z01.419 WELL WOMAN EXAM WITH ROUTINE GYNECOLOGICAL EXAM: ICD-10-CM

## 2025-06-05 PROCEDURE — 99999 PR PBB SHADOW E&M-EST. PATIENT-LVL III: CPT | Mod: PBBFAC,,, | Performed by: OBSTETRICS & GYNECOLOGY

## 2025-06-05 RX ORDER — ESTRADIOL 0.1 MG/G
1 CREAM VAGINAL DAILY
Qty: 42 G | Refills: 3 | Status: SHIPPED | OUTPATIENT
Start: 2025-06-05

## 2025-06-06 ENCOUNTER — PATIENT MESSAGE (OUTPATIENT)
Dept: HEMATOLOGY/ONCOLOGY | Facility: CLINIC | Age: 60
End: 2025-06-06
Payer: COMMERCIAL

## 2025-06-09 ENCOUNTER — HOSPITAL ENCOUNTER (OUTPATIENT)
Dept: RADIOLOGY | Facility: OTHER | Age: 60
Discharge: HOME | End: 2025-06-09
Attending: STUDENT IN AN ORGANIZED HEALTH CARE EDUCATION/TRAINING PROGRAM
Payer: COMMERCIAL

## 2025-06-09 ENCOUNTER — OFFICE VISIT (OUTPATIENT)
Dept: INTERNAL MEDICINE | Facility: CLINIC | Age: 60
End: 2025-06-09
Payer: COMMERCIAL

## 2025-06-09 VITALS — BODY MASS INDEX: 30.67 KG/M2 | WEIGHT: 202.38 LBS | HEIGHT: 68 IN

## 2025-06-09 DIAGNOSIS — M25.373 INSTABILITY OF ANKLE, UNSPECIFIED LATERALITY: ICD-10-CM

## 2025-06-09 DIAGNOSIS — R42 MAL DE DEBARQUEMENT: ICD-10-CM

## 2025-06-09 DIAGNOSIS — M25.571 ACUTE RIGHT ANKLE PAIN: ICD-10-CM

## 2025-06-09 DIAGNOSIS — M25.571 ACUTE RIGHT ANKLE PAIN: Primary | ICD-10-CM

## 2025-06-09 PROCEDURE — 73610 X-RAY EXAM OF ANKLE: CPT | Mod: 26,RT,, | Performed by: RADIOLOGY

## 2025-06-09 PROCEDURE — 98005 SYNCH AUDIO-VIDEO EST LOW 20: CPT | Mod: 95,,, | Performed by: STUDENT IN AN ORGANIZED HEALTH CARE EDUCATION/TRAINING PROGRAM

## 2025-06-09 PROCEDURE — 73610 X-RAY EXAM OF ANKLE: CPT | Mod: TC,FY,RT

## 2025-06-09 NOTE — PROGRESS NOTES
"The patient location is: [place]  The chief complaint leading to consultation is: Fall    Visit type: audiovisual    Face to Face time with patient: 18  25  minutes of total time spent on the encounter, which includes face to face time and non-face to face time preparing to see the patient (eg, review of tests), Obtaining and/or reviewing separately obtained history, Documenting clinical information in the electronic or other health record, Independently interpreting results (not separately reported) and communicating results to the patient/family/caregiver, or Care coordination (not separately reported).     Each patient to whom he or she provides medical services by telemedicine is:  (1) informed of the relationship between the physician and patient and the respective role of any other health care provider with respect to management of the patient; and (2) notified that he or she may decline to receive medical services by telemedicine and may withdraw from such care at any time.    Notes:   History of Present Illness    CHIEF COMPLAINT:  Patient presents today for right ankle injury following a fall in [place]    MUSCULOSKELETAL - RIGHT ANKLE:  She sustained a right ankle injury 2.5-3 weeks ago from a fall while walking in [place]. The ankle is not swollen but remains tender to touch, particularly on the lateral aspect and above and below the joint. She reports improvement with ankle support, similar to wearing a boot. She has chronic ankle instability, describing her ankles as unstable and wobbly, suggesting ligamentous laxity. She has a history of multiple ankle injuries, including avulsion fractures, occurring 4-5 times in the last five years.    VERTIGO:  She reports experiencing mild vertigo for the past 3 months, coinciding with starting a new job in March. She describes it as a floating sensation rather than brady dizziness, with a feeling of being "off" or experiencing a lag time. Symptoms worsened with " travel. 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 Her current symptoms are similar to her previous Mal de Debarquement experience. She has scheduled an eye exam due to not having had a vision check recently.    MEDICAL HISTORY:  She has a history of osteopenia.    MEDICATIONS:  She takes lorazepam as needed for anxiety.       Assessment & Plan        RIGHT ANKLE SPRAIN AND INSTABILITY:  Patient reports persistent tenderness 3 weeks post-injury with history of multiple ankle injuries including fractures.  Ordered right ankle x-ray to rule out fracture.  Referred to podiatry for evaluation and management of both acute pain and chronic ankle instability.  Recommend supportive boot and physical therapy to improve stability.    DIZZINESS AND VERTIGO:  Patient reports slight vertigo sensation and floaty feeling, exacerbated during travel.  Differential diagnosis includes neurological and ENT causes.  Referred to ENT for evaluation of vertigo-like symptoms and will consider neurology referral if symptoms persist.    ANXIETY DISORDER:  Patient occasionally takes lorazepam for anxiety management.        Acute right ankle pain  -     X-Ray Ankle Complete 3 View Right; Future; Expected date: 06/09/2025  -     Ambulatory referral/consult to Podiatry; Future; Expected date: 06/16/2025    Instability of ankle, unspecified laterality  -     Ambulatory referral/consult to Podiatry; Future; Expected date: 06/16/2025    Mal de debarquement  -     Ambulatory referral/consult to ENT; Future; Expected date: 06/16/2025              This note was generated with the assistance of ambient listening technology. Verbal consent was obtained by the patient and accompanying visitor(s) for the recording of patient appointment to facilitate this note. I attest to having reviewed and edited the generated note for accuracy, though some syntax or spelling  errors may persist. Please contact the author of this note for any clarification.

## 2025-06-10 ENCOUNTER — TELEPHONE (OUTPATIENT)
Dept: INTERNAL MEDICINE | Facility: CLINIC | Age: 60
End: 2025-06-10
Payer: COMMERCIAL

## 2025-06-10 ENCOUNTER — RESULTS FOLLOW-UP (OUTPATIENT)
Dept: INTERNAL MEDICINE | Facility: CLINIC | Age: 60
End: 2025-06-10

## 2025-06-10 ENCOUNTER — PATIENT MESSAGE (OUTPATIENT)
Dept: INTERNAL MEDICINE | Facility: CLINIC | Age: 60
End: 2025-06-10
Payer: COMMERCIAL

## 2025-06-10 NOTE — TELEPHONE ENCOUNTER
----- Message from Fernando Akhtar MD sent at 6/10/2025  9:48 AM CDT -----  Please call her to assist with scheduling a podiatry appointment for treatment of her fracture  ----- Message -----  From: Interface, Rad Results In  Sent: 6/10/2025   9:08 AM CDT  To: Fernando Akhtar MD

## 2025-06-11 ENCOUNTER — PATIENT MESSAGE (OUTPATIENT)
Dept: INTERNAL MEDICINE | Facility: CLINIC | Age: 60
End: 2025-06-11
Payer: COMMERCIAL

## 2025-06-11 ENCOUNTER — TELEPHONE (OUTPATIENT)
Dept: INTERNAL MEDICINE | Facility: CLINIC | Age: 60
End: 2025-06-11
Payer: COMMERCIAL

## 2025-06-11 NOTE — TELEPHONE ENCOUNTER
Copied from CRM #[id number]. Topic: General Inquiry - Patient Advice  >> Jun 11, 2025  7:57 AM Marvin wrote:  Name Of Caller: Morro        Provider Name:Trujillo Mavis         Does patient feel the need to be seen today? no        Relationship to the Pt?: patient        Contact Preference?: [phone]        What is the nature of the call?: Patient is requesting to speak with someone in the office in regards go questions that she has regarding who she needs to see for her ankle fracture. Patient is scheduled with orthopedics today at 2pm.

## 2025-06-11 NOTE — TELEPHONE ENCOUNTER
Addressed in separate encounter. Patient has spoken with office staff and scheduled appointment with Orthopedics for today.

## 2025-06-11 NOTE — TELEPHONE ENCOUNTER
"Spoke with the patient, who stated she independently scheduled an appointment with orthopedics via the patient portal due to not receiving a follow-up regarding her ankle X-ray results. I explained that I would reach out to Dr. Trujillo to request a referral to orthopedics. The patient interrupted to inform me that her insurance stated no referral was required. I acknowledged this but clarified that, per our protocol (and for billing purposes), a referral is still necessary on our end. I assured her that I would message Dr. Trujillo for signature.  The patient voiced frustration about the lack of follow-up and shared that she previously had a virtual visit with Dr. Akhtar, who told her someone would call--but no one did. I apologized for the delay and reviewed her chart with her, reading the following note from Dr. Akhtar regarding her ankle X-ray:  X-Ray Ankle Complete 3 View Right - Final Result  Provider: Fernando Akhtar MD  Date: 6/10/2025, 9:48 AM CDT  Result: "Fracture. Patient has been walking on it for two weeks; no action needed from you at this time. I had already referred her to podiatry."    The patient declined the podiatry referral, stating she believes she needs to see an orthopedic specialist or whoever is appropriate, as she would like to be evaluated for a walking boot. I informed her I would message the covering provider for further guidance.  "

## 2025-06-16 ENCOUNTER — PATIENT MESSAGE (OUTPATIENT)
Dept: OTOLARYNGOLOGY | Facility: CLINIC | Age: 60
End: 2025-06-16
Payer: COMMERCIAL

## 2025-06-19 ENCOUNTER — APPOINTMENT (OUTPATIENT)
Dept: RADIOLOGY | Facility: OTHER | Age: 60
End: 2025-06-19
Attending: PODIATRIST
Payer: COMMERCIAL

## 2025-06-19 ENCOUNTER — OFFICE VISIT (OUTPATIENT)
Dept: PODIATRY | Facility: CLINIC | Age: 60
End: 2025-06-19
Payer: COMMERCIAL

## 2025-06-19 VITALS — BODY MASS INDEX: 30.67 KG/M2 | HEIGHT: 68 IN | WEIGHT: 202.38 LBS

## 2025-06-19 DIAGNOSIS — M25.373 INSTABILITY OF ANKLE, UNSPECIFIED LATERALITY: ICD-10-CM

## 2025-06-19 DIAGNOSIS — M25.571 ACUTE RIGHT ANKLE PAIN: ICD-10-CM

## 2025-06-19 PROCEDURE — 99999 PR PBB SHADOW E&M-EST. PATIENT-LVL III: CPT | Mod: PBBFAC,,, | Performed by: PODIATRIST

## 2025-06-19 PROCEDURE — 73630 X-RAY EXAM OF FOOT: CPT | Mod: 26,RT,, | Performed by: RADIOLOGY

## 2025-06-19 PROCEDURE — 73630 X-RAY EXAM OF FOOT: CPT | Mod: TC,PN,RT

## 2025-06-19 RX ORDER — LIDOCAINE AND PRILOCAINE 25; 25 MG/G; MG/G
CREAM TOPICAL
Qty: 30 G | Refills: 3 | Status: SHIPPED | OUTPATIENT
Start: 2025-06-19

## 2025-06-19 NOTE — PROGRESS NOTES
Subjective:      Patient ID: Morro Madrigal is a 59 y.o. female.    Chief Complaint: Ankle Injury (R ankle)    Sharp deep pain and mild swelling of the right ankle in the outside surface.  Rapid onset about 4 weeks ago stepping off of a decline while inch of pain.  X-rays 6 9 twenty-five show fibular tip fracture in good position.  Denies repeat trauma and surgery.      Does relate chronic lateral ankle sprains right more than left and previous ankle fracture right managed conservatively with success.    Review of Systems   Constitutional: Negative for chills, diaphoresis, fever, malaise/fatigue and night sweats.   Cardiovascular:  Negative for claudication, cyanosis, leg swelling and syncope.   Skin:  Negative for color change, dry skin, nail changes, rash, suspicious lesions and unusual hair distribution.   Musculoskeletal:  Positive for joint pain and joint swelling. Negative for falls, muscle cramps, muscle weakness and stiffness.   Gastrointestinal:  Negative for constipation, diarrhea, nausea and vomiting.   Neurological:  Negative for brief paralysis, disturbances in coordination, focal weakness, numbness, paresthesias, sensory change and tremors.           Objective:      Physical Exam  Constitutional:       General: She is not in acute distress.     Appearance: She is well-developed. She is not diaphoretic.   Cardiovascular:      Pulses:           Popliteal pulses are 2+ on the right side and 2+ on the left side.        Dorsalis pedis pulses are 2+ on the right side and 2+ on the left side.        Posterior tibial pulses are 2+ on the right side and 2+ on the left side.      Comments: Capillary refill 3 seconds all toes/distal feet, all toes/both feet warm to touch.      Negative lymphadenopathy bilateral popliteal fossa and tarsal tunnel.      Negavie lower extremity edema bilateral.    Musculoskeletal:      Right ankle: No swelling, deformity, ecchymosis or lacerations. Normal range of motion. Normal pulse.       Right Achilles Tendon: Normal. No defects. Fraser's test negative.      Comments: Sharp deep pain to palpation of the fibular tip of the right without gross deformity loss of function or signs of acute trauma.  Guarding prevents stress testing right ankle.      Left ankle has increased clinical talar tilt with palpable attenuation of the calcaneofibular ligament and positive anterior drawer sign.  Negative external rotation test negative squeeze test negative Sidhu test (all left).    Ankle dorsiflexion decreased at <10 degrees bilateral with moderate increase with knee flexion bilateral.     Lymphadenopathy:      Lower Body: No right inguinal adenopathy. No left inguinal adenopathy.      Comments: Negative lymphadenopathy bilateral popliteal fossa and tarsal tunnel.    Negative lymphangitic streaking bilateral feet/ankles/legs.   Skin:     General: Skin is warm and dry.      Capillary Refill: Capillary refill takes 2 to 3 seconds.      Coloration: Skin is not pale.      Findings: No abrasion, bruising, burn, ecchymosis, erythema, laceration, lesion or rash.      Nails: There is no clubbing.      Comments:   Skin is normal age and health appropriate color, turgor, texture, and temperature bilateral lower extremities without ulceration, hyperpigmentation, discoloration, masses nodules or cords palpated.  No ecchymosis, erythema, edema, or cardinal signs of infection bilateral lower extremities.    Neurological:      Mental Status: She is alert and oriented to person, place, and time.      Sensory: No sensory deficit.      Motor: No tremor, atrophy or abnormal muscle tone.      Gait: Gait normal.      Deep Tendon Reflexes:      Reflex Scores:       Patellar reflexes are 2+ on the right side and 2+ on the left side.       Achilles reflexes are 2+ on the right side and 2+ on the left side.     Comments: Negative tinel sign to percussion sural, superficial peroneal, deep peroneal, saphenous, and posterior tibial  nerves right and left ankles and feet.      Psychiatric:         Behavior: Behavior is cooperative.           Assessment:       Encounter Diagnoses   Name Primary?    Acute right ankle pain     Instability of ankle, unspecified laterality          Plan:       Morro was seen today for ankle injury.    Diagnoses and all orders for this visit:    Acute right ankle pain  -     Ambulatory referral/consult to Podiatry  -     ORTHOTIC DEVICE (DME)  -     X-Ray Foot Complete Right; Future  -     X-Ray Calcaneus 2 View Right; Future  -     AIR CAST WALKER BOOT FOR HOME USE    Instability of ankle, unspecified laterality  -     Ambulatory referral/consult to Podiatry  -     ORTHOTIC DEVICE (DME)  -     X-Ray Foot Complete Right; Future  -     X-Ray Calcaneus 2 View Right; Future  -     AIR CAST WALKER BOOT FOR HOME USE    Other orders  -     LIDOcaine-prilocaine (EMLA) cream; Apply topically as needed.      I counseled the patient on her conditions, their implications and medical management.    Prescribed custom orthotics with deep heel cup to help control inversion eversion of the heel and subtalar joint and ankle.    Fracture boot right.      Ambulate to tolerance with boot all times weight-bearing.      X-rays right foot and heel.      Follow up 1 month, sooner p.r.n. for x-rays, physical therapy, likely discharge.                Follow up in about 1 month (around 7/19/2025).

## 2025-06-23 ENCOUNTER — CLINICAL SUPPORT (OUTPATIENT)
Facility: CLINIC | Age: 60
End: 2025-06-23
Payer: COMMERCIAL

## 2025-06-23 ENCOUNTER — OFFICE VISIT (OUTPATIENT)
Dept: OTOLARYNGOLOGY | Facility: CLINIC | Age: 60
End: 2025-06-23
Payer: COMMERCIAL

## 2025-06-23 VITALS
BODY MASS INDEX: 30.71 KG/M2 | DIASTOLIC BLOOD PRESSURE: 68 MMHG | HEART RATE: 57 BPM | SYSTOLIC BLOOD PRESSURE: 139 MMHG | WEIGHT: 202 LBS

## 2025-06-23 DIAGNOSIS — H81.8X9 OTHER DISORDERS OF VESTIBULAR FUNCTION, UNSPECIFIED EAR: ICD-10-CM

## 2025-06-23 DIAGNOSIS — R42 MAL DE DEBARQUEMENT: ICD-10-CM

## 2025-06-23 DIAGNOSIS — H91.92 HEARING LOSS OF LEFT EAR, UNSPECIFIED HEARING LOSS TYPE: Primary | ICD-10-CM

## 2025-06-23 PROCEDURE — 92557 COMPREHENSIVE HEARING TEST: CPT | Mod: S$GLB,,,

## 2025-06-23 PROCEDURE — 99204 OFFICE O/P NEW MOD 45 MIN: CPT | Mod: S$GLB,,, | Performed by: STUDENT IN AN ORGANIZED HEALTH CARE EDUCATION/TRAINING PROGRAM

## 2025-06-23 PROCEDURE — 92567 TYMPANOMETRY: CPT | Mod: S$GLB,,,

## 2025-06-23 NOTE — PROGRESS NOTES
6/23/2025    Audiologic Evaluation    Morro Madrigal was seen today in the clinic for an audiologic evaluation.  Patient's main complaint was imbalance. She reported a constant woozy feeling following a plane ride a few weeks ago. Ms. Madrigal reported history of Mal de Débarquement several years ago following a cruise trip that was resolved with PT. She reported some aural pressure bilaterally. She reported ringing in both ears from time to time. Ms. Madrigal denied decreased hearing and otalgia.    Tympanometry revealed Type As in the right ear and Type As in the left ear.     Audiogram results revealed normal hearing sensitivity in the right ear and essentially normal hearing with a mild hearing loss at 3000 and 8000 Hz in the left ear.      Speech reception thresholds were noted at 10 dBHL in the right ear and 10 dBHL in the left ear.    Speech discrimination scores were 100% in the right ear and 100% in the left ear.    Recommendations:  Otologic evaluation  Annual audiogram or sooner if change perceived  Hearing protection in noise

## 2025-06-23 NOTE — PROGRESS NOTES
"  Ear, Nose, & Throat  Otolaryngology - Head & Neck Surgery    Summary of Visit:  Morro Madrigal is a kind patient who was referred to me by Dr. Fernando Akhtar in consultation for Tinnitus  States that 15 years ago she went on cruise and was dx with mal de debarquement syndrome. Pt states that she did physical therapy for a couple of months and her symptoms resolved. Pt states that one month ago she started to have feelings imbalance. She states that she feels like she is static and the room is in motion.  She believes that a long flight to [facility] could have caused symptoms to return. Pt reports constant aural fullness bilaterally.    Subjective:     Chief Complaint:   Chief Complaint   Patient presents with    Tinnitus       Morro Madrigal is a 59 y.o. female who was {display:63056:o:"referred to me by *** in consultation","self-referred","referred to me by Dr. Fernando Akhtar in consultation"} for {JKHPIDIZZCC:28808::"dizziness"}.    She describes {JKHPIDIZZCLASS:11935} {JKHPIDIZZTYPE:19358} which {IS NOT/IS:} associated with fluctuating aural symptoms.     ***    Minimum Duration: {JKHPIDIZZTIME:14130}  Maximum Duration: {JKHPIDIZZTIME:65691}  Triggers: {JKHPIDIZZTRI}    She {does/does not:} have a history of migraines {JKHPIDIZZMIGRAINE:03578}. Associated migraine-type symptoms include: {JKHPIDIZZVESTIBULARMIGRAINE:87762}. These {Actions; do/do not:} occur with more than 50% of dizzy episodes.     Otologic history:  Other risk factors include: {JKHPIDIZZRISK:97024}.      Past Medical History  Active Ambulatory Problems     Diagnosis Date Noted    Generalized anxiety disorder 2025     Resolved Ambulatory Problems     Diagnosis Date Noted    No Resolved Ambulatory Problems     No Additional Past Medical History       Past Surgical History  She has a past surgical history that includes Cervical biopsy w/ loop electrode excision (2019).    Past Surgical History:   Procedure Laterality " "Date    CERVICAL BIOPSY  W/ LOOP ELECTRODE EXCISION  2019        Family History  Her family history includes Atrial fibrillation in her mother; Coronary artery disease in her maternal grandfather; Lung cancer in her father; Vision loss in her mother; giant cell arteritis in her maternal aunt and mother.    Social History  She reports that she quit smoking about 44 years ago. Her smoking use included cigarettes. She started smoking about 34 years ago. She has never been exposed to tobacco smoke. She has never used smokeless tobacco. She reports current alcohol use. She reports that she does not use drugs.    Allergies  She has no known allergies.    Medications  She has a current medication list which includes the following prescription(s): estradiol, lidocaine-prilocaine, and lorazepam.    ROS:  Pertinent positive and negative review of systems as noted in HPI.     Objective:     /68 (BP Location: Left arm, Patient Position: Sitting)   Pulse (!) 57   Wt 91.6 kg (202 lb)   BMI 30.71 kg/m²      ORTHOSTATICS  {JKEXAMORTHOSTATICS:88090}    Constitutional: Well appearing, communicating. No acute distress  Eyes:    Pupils: Equal and reactive  EOM: Intact bilaterally  Head/Face: House Brackmann I Bilaterally.  Right Ear:    Auricle normally developed   EAC: {JKEXAMEAC:70481::"normal"}   Tympanic membrane: {JKEXAMTM:69388::"intact"}   Middle Ear: {JKEXAMMIDEAR:73842::"No effusion present","Ossicles in normal position"}  Left Ear:    Auricle normally developed   EAC: {JKEXAMEAC:06614::"normal"}   Tympanic membrane: {JKEXAMTM:86001::"intact"}   Middle Ear: {JKEXAMMIDEAR:54736::"No effusion present","Ossicles in normal position"}  Vestibular:   Spontaneous Nystagmus: {jkspontaneousnystagmus:73818::"none"}   Smooth Pursuit: {JKEXAMGROSSLYNORMAL:24014}   Saccades: {JKEXAMGROSSLYNORMAL:79925}     Gaze-Evoked Nystagmus: {JKECCENTRICNYSTAGMUS:07657::"None"}   Head-Impulse: {JKEXAMVOR:05278}   Fixation Suppression: " "{JKFIXATIONSUPPRESSION:85420::"DNT"}  Gait: {JKEXAMGAIT:12149::"Normal"}    Rani-Hallpike: {JKDIXHALLPIKE:96186::"DNT"}    Passive Head Shake: {JKPHST:87435::"DNT"}    Test of Skew: {JKTESTOFSKEW:05315::"DNT"}   Fakuda Step Test: {JKFAKUDA:01610::"DNT"}  Neuro/Psychiatric:     Affect: Normal   Cognition: {jkexamcognition:32926}  Cerebellar   Alternating Finger to Nose: {JKEXAMFINGERTONOSE:76330::"DNT"}   Rapid Alternating Movements: {JKRAPIDALTERNATINGMOVEMENTS:56491::"DNT"}   Scanning Speech: {JKEXAMSCANNINGSPEECH:84238::"Not Present"}  Proprioception   Romberg: {JKROMBER::"DNT"}  Respiratory: No increased WOB, no stridor     Data Review:   MEDICAL RECORDS    I have reviewed the following medical records relevant to the care of this patient from unique sources outside of my institution or departmental specialty:  {JKDATAMEDICALRECORDS:12660}    LABS    I have independently reviewed the lab results shown above. Findings significant for the conditions being treated include: {NONE:77870::"none"}    IMAGING    {JKRESULTIMAGIN}    AUDIO    {Not Performed:85965::"not performed"}    Procedures:       Assessment:     1. Mal de debarquement        Plan:     I had a long discussion with {display:48261:o:"the patient and her ***","the patient"} regarding her condition and the further workup and management options.  ***            Voice recognition software was used in the creation of this note/communication and any sound-alike errors which may have occurred from its use should be taken in context when interpreting. If such errors prevent a clear understanding of the note/communication, please contact the office for clarification.     No orders of the defined types were placed in this encounter.          "

## 2025-06-23 NOTE — PROGRESS NOTES
Ear, Nose, & Throat  Otolaryngology - Head & Neck Surgery      Subjective:     Chief Complaint:   Chief Complaint   Patient presents with    Tinnitus       Morro Madrigal is a 59 y.o. female who was referred to me by Dr. Fernando Akhtar in consultation for dizziness.    She describes non-positional and constant dysequilibrium which is not associated with fluctuating aural symptoms.     She reports a floating dysequilibrium over the past four weeks following a trip to [place]. She has a history of similar dizziness following passive motion or motion without movement (e.g. treadmill). States that it is slowly getting better but has not gone away. Does not note any provoking factors. Best in the morning while still in bed. Has taken benadryl with mild improvement. Also reports a brief intermittent NP tinnitus not associated with worsening dysequilibrium.     Minimum Duration: constant  Maximum Duration:   Triggers: none reported    Otologic history: None  Other risk factors include: Anxiety.      Past Medical History  Active Ambulatory Problems     Diagnosis Date Noted    Generalized anxiety disorder 05/07/2025     Resolved Ambulatory Problems     Diagnosis Date Noted    No Resolved Ambulatory Problems     No Additional Past Medical History       Past Surgical History  She has a past surgical history that includes Cervical biopsy w/ loop electrode excision (2019).    Past Surgical History:   Procedure Laterality Date    CERVICAL BIOPSY  W/ LOOP ELECTRODE EXCISION  2019        Family History  Her family history includes Atrial fibrillation in her mother; Coronary artery disease in her maternal grandfather; Lung cancer in her father; Vision loss in her mother; giant cell arteritis in her maternal aunt and mother.    Social History  She reports that she quit smoking about 44 years ago. Her smoking use included cigarettes. She started smoking about 34 years ago. She has never been exposed to tobacco smoke. She has never  used smokeless tobacco. She reports current alcohol use. She reports that she does not use drugs.    Allergies  She has no known allergies.    Medications  She has a current medication list which includes the following prescription(s): estradiol, lidocaine-prilocaine, and lorazepam.    ROS:  Pertinent positive and negative review of systems as noted in HPI.     Objective:     /68 (BP Location: Left arm, Patient Position: Sitting)   Pulse (!) 57   Wt 91.6 kg (202 lb)   BMI 30.71 kg/m²      ORTHOSTATICS  not performed    Constitutional: Well appearing, communicating. No acute distress  Eyes:    Pupils: Equal and reactive  EOM: Intact bilaterally  Head/Face: House Brackmann I Bilaterally.  Right Ear:    Auricle normally developed   EAC: normal   Tympanic membrane: intact   Middle Ear: No effusion present and Ossicles in normal position  Left Ear:    Auricle normally developed   EAC: normal   Tympanic membrane: intact   Middle Ear: No effusion present and Ossicles in normal position  Vestibular:   Spontaneous Nystagmus: none   Smooth Pursuit: grossly unremarkable   Saccades: grossly unremarkable     Gaze-Evoked Nystagmus: None   Head-Impulse: DNT   Fixation Suppression: DNT  Gait: Normal    [place]-Hallpike: DNT    Passive Head Shake: DNT    Test of Skew: DNT   Fakuda Step Test: DNT  Neuro/Psychiatric:     Affect: Normal   Cognition: Appropriate  Cerebellar   Alternating Finger to Nose: DNT   Rapid Alternating Movements: DNT   Scanning Speech: Not Present  Proprioception   Romberg: Subjectively unsteady  Respiratory: No increased WOB, no stridor     Data Review:   MEDICAL RECORDS    I have reviewed the following medical records relevant to the care of this patient from unique sources outside of my institution or departmental specialty:  Primary Care    LABS    I have independently reviewed the lab results shown above. Findings significant for the conditions being treated include: none    IMAGING    No pertinent  imaging available    AUDIO    I have independently reviewed the following audiogram and reviewed the report. Findings as follows:     Pure Tone Audiometry: Symmetric  Right - Normal (0-25 dB) to Mild SNHL  Left - Normal (0-25 dB) to Mild SNHL    Tympanometry  Right: Type As  Left: Type As    Word Discrimination Score  Right: 100%  Left 100%      Procedures:       Assessment:     1. Mal de debarquement      Plan:     I had a long discussion with the patient regarding her condition and the further workup and management options.  Provokating by passive motions suggestive of mal de debarquement. Would recommend vestibular therapy and PRN treatment with meclizine.     Voice recognition software was used in the creation of this note/communication and any sound-alike errors which may have occurred from its use should be taken in context when interpreting. If such errors prevent a clear understanding of the note/communication, please contact the office for clarification.     No orders of the defined types were placed in this encounter.

## 2025-06-24 ENCOUNTER — OFFICE VISIT (OUTPATIENT)
Dept: INTERNAL MEDICINE | Facility: CLINIC | Age: 60
End: 2025-06-24
Payer: COMMERCIAL

## 2025-06-24 DIAGNOSIS — M54.6 ACUTE RIGHT-SIDED THORACIC BACK PAIN: Primary | ICD-10-CM

## 2025-06-24 PROCEDURE — 98007 SYNCH AUDIO-VIDEO EST HI 40: CPT | Mod: 95,,, | Performed by: NURSE PRACTITIONER

## 2025-06-24 RX ORDER — CYCLOBENZAPRINE HCL 5 MG
5 TABLET ORAL NIGHTLY
Qty: 14 TABLET | Refills: 0 | Status: SHIPPED | OUTPATIENT
Start: 2025-06-24 | End: 2025-07-08

## 2025-06-24 NOTE — PROGRESS NOTES
The patient location is: [place]  The chief complaint leading to consultation is:    Visit type: audiovisual    Face to Face time with patient: 30   45 minutes of total time spent on the encounter, which includes face to face time and non-face to face time preparing to see the patient (eg, review of tests), Obtaining and/or reviewing separately obtained history, Documenting clinical information in the electronic or other health record, Independently interpreting results (not separately reported) and communicating results to the patient/family/caregiver, or Care coordination (not separately reported).         Each patient to whom he or she provides medical services by telemedicine is:  (1) informed of the relationship between the physician and patient and the respective role of any other health care provider with respect to management of the patient; and (2) notified that he or she may decline to receive medical services by telemedicine and may withdraw from such care at any time.    Notes:   Subjective     Patient ID: Morro Madrigal is a 59 y.o. female.    Chief Complaint: Back Pain        Morro Madrigal is a 59 year old female who presents with a 5 day history of constant right-sided thoracic back pain.  Pain is localized to the bra line and occasionally felt under the right breast/rib cage.  She describes the pain as aching, burning, and stabbing in nature.  It is non-radiating and rated 7/10 in severity, which she considers moderate.  Pain is worsened at night, particularly when lying down, and leads to poor sleep quality.  She reports waking up around 3:00 a.m. nightly due to discomfort.  Associated symptoms include morning stiffness and insomnia.  She has tried ibuprofen and Tylenol with moderate relief.  Risk factors include obesity, menopause, and osteopenia.  She is requesting something to improve sleep and allow more restful nights due to her work demands.  Back Pain  This is a new problem. The current episode  started in the past 7 days (5 day history). The problem occurs constantly. The problem has been waxing and waning since onset. The pain is present in the thoracic spine (right side at bra level). The quality of the pain is described as aching, burning and stabbing. The pain does not radiate. The pain is at a severity of 7/10. The pain is moderate. The pain is Worse during the night. The symptoms are aggravated by lying down. Stiffness is present In the morning. Pertinent negatives include no abdominal pain, bladder incontinence, bowel incontinence, chest pain, dysuria, fever, headaches, leg pain, numbness, paresis, paresthesias, pelvic pain, perianal numbness, tingling, weakness or weight loss. Risk factors include menopause and obesity (has osteopenia). Treatments tried: ibuprofen 600 mg, Tylenol. The treatment provided moderate relief.       Review of Systems   Constitutional:  Negative for fever and weight loss.   Cardiovascular:  Negative for chest pain.   Gastrointestinal:  Negative for abdominal pain and bowel incontinence.   Genitourinary:  Negative for bladder incontinence, dysuria, hematuria and pelvic pain.   Musculoskeletal:  Positive for back pain. Negative for leg pain.   Neurological:  Negative for tingling, weakness, numbness, headaches and paresthesias.          Objective     Constitutional: She is oriented to person, place, and time and well-developed, well-nourished, and in no distress. No distress.   HENT:   Head: Normocephalic and atraumatic.   Eyes: No scleral icterus.   Pulmonary/Chest: Effort normal. No respiratory distress.   Neurological: She is alert and oriented to person, place, and time.   Skin: She is not diaphoretic.   Psychiatric: Mood and affect normal.          Assessment and Plan     1. Acute right-sided thoracic back pain  -     cyclobenzaprine (FLEXERIL) 5 MG tablet; Take 1 tablet (5 mg total) by mouth nightly. for 14 days  Dispense: 14 tablet; Refill: 0        Voltaren topical  gel.  Hydration.  Rest.  Gentle movements.  Discussed proper body mechanics.  Please seek ED if experiencing numbness or weakness in legs, difficulty walking, or foot drop, and or loss of bowel or bladder control.  F/U with PCP in 1 week or prn sooner if symptoms worsen.           No follow-ups on file.                          Answers submitted by the patient for this visit:  Back Pain Questionnaire (Submitted on 6/24/2025)  Chief Complaint: Back pain  genital pain: No

## 2025-06-28 ENCOUNTER — PATIENT MESSAGE (OUTPATIENT)
Dept: INTERNAL MEDICINE | Facility: CLINIC | Age: 60
End: 2025-06-28
Payer: COMMERCIAL

## 2025-07-17 ENCOUNTER — OFFICE VISIT (OUTPATIENT)
Dept: PODIATRY | Facility: CLINIC | Age: 60
End: 2025-07-17
Payer: COMMERCIAL

## 2025-07-17 VITALS
DIASTOLIC BLOOD PRESSURE: 85 MMHG | HEART RATE: 73 BPM | BODY MASS INDEX: 30.61 KG/M2 | WEIGHT: 201.94 LBS | HEIGHT: 68 IN | SYSTOLIC BLOOD PRESSURE: 120 MMHG

## 2025-07-17 DIAGNOSIS — M25.373 INSTABILITY OF ANKLE, UNSPECIFIED LATERALITY: ICD-10-CM

## 2025-07-17 DIAGNOSIS — M25.571 ACUTE RIGHT ANKLE PAIN: Primary | ICD-10-CM

## 2025-07-17 PROCEDURE — 99999 PR PBB SHADOW E&M-EST. PATIENT-LVL III: CPT | Mod: PBBFAC,,, | Performed by: PODIATRIST

## 2025-07-17 NOTE — PROGRESS NOTES
Subjective:      Patient ID: Morro Madrigal is a 60 y.o. female.    Chief Complaint: Follow-up (Ankle Fx)    Lateral ankle strain with fibular tip fracture proximally 8 weeks out.  Patient has resumed normal ambulation and athletic type shoes with mild discomfort and a feeling of stiffness.  Denies re-injury, trauma, surgery right ankle.  Not at goal    Review of Systems   Constitutional: Negative for chills, diaphoresis, fever, malaise/fatigue and night sweats.   Cardiovascular:  Negative for claudication, cyanosis, leg swelling and syncope.   Skin:  Negative for color change, dry skin, nail changes, rash, suspicious lesions and unusual hair distribution.   Musculoskeletal:  Positive for joint pain. Negative for falls, joint swelling, muscle cramps, muscle weakness and stiffness.   Gastrointestinal:  Negative for constipation, diarrhea, nausea and vomiting.   Neurological:  Negative for brief paralysis, disturbances in coordination, focal weakness, numbness, paresthesias, sensory change and tremors.           Objective:      Physical Exam  Constitutional:       General: She is not in acute distress.     Appearance: She is well-developed. She is not diaphoretic.   Cardiovascular:      Pulses:           Popliteal pulses are 2+ on the right side and 2+ on the left side.        Dorsalis pedis pulses are 2+ on the right side and 2+ on the left side.        Posterior tibial pulses are 2+ on the right side and 2+ on the left side.      Comments: Capillary refill 3 seconds all toes/distal feet, all toes/both feet warm to touch.      Negative lymphadenopathy bilateral popliteal fossa and tarsal tunnel.      Negavie lower extremity edema bilateral.    Musculoskeletal:      Right ankle: No swelling, deformity, ecchymosis or lacerations. Normal range of motion. Normal pulse.      Right Achilles Tendon: Normal. No defects. Fraser's test negative.      Comments: Positive anterior drawer sign right.  Otherwise ankle stability  testing within normal.Ankle dorsiflexion decreased at <10 degrees bilateral with moderate increase with knee flexion bilateral.      Minor tenderness to palpation anterolateral ankle of the area the ATFL and anterior lateral joint capsule lateral gutter of the ankle without deformity loss of function or signs of acute trauma.   Lymphadenopathy:      Lower Body: No right inguinal adenopathy. No left inguinal adenopathy.      Comments: Negative lymphadenopathy bilateral popliteal fossa and tarsal tunnel.    Negative lymphangitic streaking bilateral feet/ankles/legs.   Skin:     General: Skin is warm and dry.      Capillary Refill: Capillary refill takes 2 to 3 seconds.      Coloration: Skin is not pale.      Findings: No abrasion, bruising, burn, ecchymosis, erythema, laceration, lesion or rash.      Nails: There is no clubbing.      Comments:   Skin is normal age and health appropriate color, turgor, texture, and temperature bilateral lower extremities without ulceration, hyperpigmentation, discoloration, masses nodules or cords palpated.  No ecchymosis, erythema, edema, or cardinal signs of infection bilateral lower extremities.     Neurological:      Mental Status: She is alert and oriented to person, place, and time.      Sensory: No sensory deficit.      Motor: No tremor, atrophy or abnormal muscle tone.      Gait: Gait normal.      Deep Tendon Reflexes:      Reflex Scores:       Patellar reflexes are 2+ on the right side and 2+ on the left side.       Achilles reflexes are 2+ on the right side and 2+ on the left side.     Comments: Negative tinel sign to percussion sural, superficial peroneal, deep peroneal, saphenous, and posterior tibial nerves right and left ankles and feet.    Negative allodynia both feet   Psychiatric:         Behavior: Behavior is cooperative.           Assessment:       Encounter Diagnoses   Name Primary?    Acute right ankle pain Yes    Instability of ankle, unspecified laterality           Plan:       Morro was seen today for follow-up.    Diagnoses and all orders for this visit:    Acute right ankle pain  -     Ambulatory Referral/Consult to Physical Therapy; Future    Instability of ankle, unspecified laterality  -     Ambulatory Referral/Consult to Physical Therapy; Future      I counseled the patient on her conditions, their implications and medical management.        Continue calf stretches, over-the-counter inserts, activity to tolerance in athletic type shoes.      Physical therapy to help optimize stability and strengthening immobility.      P.r.n.          Follow up if symptoms worsen or fail to improve.

## 2025-07-24 ENCOUNTER — CLINICAL SUPPORT (OUTPATIENT)
Dept: REHABILITATION | Facility: OTHER | Age: 60
End: 2025-07-24
Attending: PODIATRIST
Payer: COMMERCIAL

## 2025-07-24 DIAGNOSIS — M25.373 INSTABILITY OF ANKLE, UNSPECIFIED LATERALITY: ICD-10-CM

## 2025-07-24 DIAGNOSIS — R26.9 GAIT ABNORMALITY: ICD-10-CM

## 2025-07-24 DIAGNOSIS — M25.571 PAIN IN LATERAL PORTION OF RIGHT ANKLE: Primary | ICD-10-CM

## 2025-07-24 DIAGNOSIS — M25.571 ACUTE RIGHT ANKLE PAIN: ICD-10-CM

## 2025-07-24 PROCEDURE — 97162 PT EVAL MOD COMPLEX 30 MIN: CPT | Mod: PN | Performed by: PHYSICAL THERAPIST

## 2025-07-24 PROCEDURE — 97530 THERAPEUTIC ACTIVITIES: CPT | Mod: PN | Performed by: PHYSICAL THERAPIST

## 2025-07-24 NOTE — PROGRESS NOTES
[facility]     Physical Therapy Evaluation       Name: Morro Madrigal  Clinic Number: [id number]    Therapy Diagnosis:   Encounter Diagnoses   Name Primary?    Acute right ankle pain     Instability of ankle, unspecified laterality     Pain in lateral portion of right ankle Yes    Gait abnormality         Physician: Tucker Bender DPM    Physician Orders: PT Eval and Treat   Medical Diagnosis from Referral: M25.571 (ICD-10-CM) - Acute right ankle pain M25.373 (ICD-10-CM) - Instability of ankle, unspecified laterality  Evaluation Date: 7/24/2025  Authorization Period Expiration: pending  Plan of Care Expiration: 9/17/2025  Progress Note Due: 8/17/2025  Visit # / Visits authorized: 1/ pending   FOTO: 1/ 3    Precautions: Standard     Time In: 0915  Time Out: 1005  Total Billable Time: 15 minutes    Subjective     Date of onset: end of May     History of current condition - Morro reports: she was traveling in [place], was walking to the train and her ankle rolled off a curb that she hadn't seen and she fell onto her side. She's fractured that ankle before, so had some familiarity. She was able to weight bear immediately, so she just kept wrapped and was able to get through her trip. It was still very tender, so she went to the doctor when she got back home and they found a malleolar fracture and put her in a boot for a few weeks. Recently released from boot and gradually getting back to normal activity. Has started trying to get back to [place] and weight lifting. Still having tenderness to lateral ankle. Notes discomfort with lunges and pain with heel raises. She feels like she doesn't have stability laterally in her ankle, and wants to improve her function.     Falls: none since injury     Imaging: bone scan films: (6/9/2025) FINDINGS:  There is a minimally displaced fracture involving the distal tip of the lateral malleolus, likely an avulsion type injury.  This appears recent, may be  subacute.  Mild overlying soft tissue swelling.     No evidence of a displaced fracture elsewhere.  No osseous destructive lesion.     No apparent dislocation.     No advanced degenerative change.     No radiopaque foreign body.     Impression:     Lateral malleolar fracture, as above.    Prior Therapy: no  Social History: Pt lives alone in [facility] with 3 steps to enter. Has large dog  Occupation: works from home  Prior Level of Function: I with ADL's and driving. Active with [place], weight lifting at home. Goes hiking. Had been getting back into jogging prior to injury   Current Level of Function: I with ADL's and driving. Gradually getting back into prior exercise, limited with bike and some motions with weight lifting. Hasn't tried running, wouldn't want to until after the summer anyway.     Pain:  Current 0/10, worst 4/10, best 0/10   Location: lateral R ankle   Description: Aching and Sharp, stiff   Aggravating Factors: quick turns, lunging, pressure through toe, sometimes different movements with walking   Easing Factors: avoiding painful motions     Patients goals: improving overall ankle function to avoid falling and stay active. Return to her normal exercise and be able to get back to jogging in the fall      Medical History:   No past medical history on file.    Surgical History:   Morro Madrigal  has a past surgical history that includes Cervical biopsy w/ loop electrode excision (2019).    Medications:   Morro has a current medication list which includes the following prescription(s): estradiol, lidocaine-prilocaine, and lorazepam.    Allergies:   Review of patient's allergies indicates:  No Known Allergies     Objective      Observation: Pt is alert and oriented, good historian.     MMT Left Right    Ankle Plantarflexion 5/5 5/5    Ankle Dorsiflexion 5/5 5/5    Ankle Inversion 5/5 5/5    Ankle Eversion 5/5 5/5    Knee Extension 5/5 5/5    Knee Flexion 5/5 5/5    Hip Flexion 4+/5 4+/5    Hip External Rotation  "5/5 4+/5    Hip Internal Rotation 5/5 5/5        Ankle  Right  Left Pain/Dysfunction with Movement    AROM PROM AROM PROM    Plantarflexion 40 50 45 50    Dorsiflexion  Knee ext 0 +5 -5 0    Inversion 25 30 30 35    Eversion 0 2 5 10      Great Toe:   -Flexion WFL  -Extension WFL    Flexibility:    Right Left  Gastroc   mod mod  Soleus    mod mod  Flexor hallicus longus  WFL WFL    Joint Mobility: mild stiffness with posterior talar glide R compared to L      Balance:   SLS   Right: able to maintain, limited ankle strategy with increased hip and trunk strategy   Left: able to maintain, increased ankle and trunk strategy     Gait: increased ankle eversion B 2* limited dorsiflexion        Limitation/Restriction for FOTO Ankle Survey    Therapist reviewed FOTO scores for Morro Madrigal on 7/24/2025.   FOTO documents entered into [employer] - see Media section.    Intake Score: 69%    Goal score: 78%       Treatment     Total Treatment time (time-based codes) separate from Evaluation: 10 minutes      Morro received the treatments listed below:      therapeutic activities to improve functional performance for 10  minutes, including:  Development, demonstration, and review of home exercise program to include:   Toe yoga x 20  Arch doming x 20  GS and soleus stretches 3 x 30"           Patient Education and Home Exercises     Education provided:   - therapy rationale and plan of care     Written Home Exercises Provided: yes. Exercises were reviewed and Morro was able to demonstrate them prior to the end of the session.  Morro demonstrated good  understanding of the education provided. See EMR under Patient Instructions for exercises provided during therapy sessions.    Assessment     Morro is a 60 y.o. female referred to outpatient Physical Therapy with a medical diagnosis of M25.571 (ICD-10-CM) - Acute right ankle pain M25.373 (ICD-10-CM) - Instability of ankle, unspecified laterality. Patient presents with pain to lateral ankle " at distal malleolus consistent with recent fibular fracture and history of multiple ankle injuries over the years. Strength overall is functional, but limited intrinsic activation contributes to difficulty with stability. Limited ROM to R ankle in dorsiflexion and eversion. Noted thickened tissue distal to malleolus limited to range. Able to maintain balance in SLS, but increased trunk strategy utilized. Increased eversion to B feet with gait 2* limited dorsiflexion with gastroc/soleus restrictions.     Patient prognosis is Good.   Patient will benefit from skilled outpatient Physical Therapy to address the deficits stated above and in the chart below, provide patient /family education, and to maximize patientt's level of independence.     Plan of care discussed with patient: Yes  Patient's spiritual, cultural and educational needs considered and patient is agreeable to the plan of care and goals as stated below:     Anticipated Barriers for therapy: standard    Medical Necessity is demonstrated by the following  History  Co-morbidities and personal factors that may impact the plan of care [] LOW: no personal factors / co-morbidities  [x] MODERATE: 1-2 personal factors / co-morbidities  [] HIGH: 3+ personal factors / co-morbidities    Moderate / High Support Documentation:   Co-morbidities affecting plan of care: pt report multiple R ankle sprains and fractures (ALICJA)    Personal Factors:   lifestyle     Examination  Body Structures and Functions, activity limitations and participation restrictions that may impact the plan of care [] LOW: addressing 1-2 elements  [x] MODERATE: 3+ elements  [] HIGH: 4+ elements (please support below)    Moderate / High Support Documentation: ROM, balance, gait     Clinical Presentation [] LOW: stable  [x] MODERATE: Evolving  [] HIGH: Unstable     Decision Making/ Complexity Score: moderate       Goals:  Short Term Goals (4 Weeks):   1. Patient to have improved B dorsiflexion PROM by  25% or greater for ease with ADL's such as stair descent  2. Patient to have improved single limb balance as noted by 10 sec or greater ea LE for improved gait stability   3. Patient to report decreased pain in R ankle by 20% or greater for independence with ADL's    Long Term Goals (8 Weeks):   1. Patient to have improved subjective report of disability as noted by a score of 78% or greater on the FOTO ankle/foot questionnaire   2. Patient to be independent with home exercise program for improved self management of condition  3. Patient will have increased B ankle strength to 5/5 grossly for independence with ADL's such as squatting  4. Patient will report gradual return to [place] and weight lifting, as well as initiate gradual return to jogging without pain exacerbation for return to PLOF.     Plan     Plan of care Certification: 7/24/2025 to 9/17/2025.    Outpatient Physical Therapy 2 times weekly for 8 weeks to include the following interventions: Electrical Stimulation unattended, Gait Training, Iontophoresis (with dexamethasone), Manual Therapy, Moist Heat/ Ice, Neuromuscular Re-ed, Patient Education, Therapeutic Activities, and Therapeutic Exercise.     Mine Carl, PT, DPT, OCS       Physician's Signature: _________________________________________ Date: ________________

## 2025-07-29 ENCOUNTER — DOCUMENTATION ONLY (OUTPATIENT)
Dept: REHABILITATION | Facility: OTHER | Age: 60
End: 2025-07-29

## 2025-07-29 ENCOUNTER — CLINICAL SUPPORT (OUTPATIENT)
Dept: REHABILITATION | Facility: OTHER | Age: 60
End: 2025-07-29
Payer: COMMERCIAL

## 2025-07-29 DIAGNOSIS — R26.9 GAIT ABNORMALITY: ICD-10-CM

## 2025-07-29 DIAGNOSIS — M25.571 PAIN IN LATERAL PORTION OF RIGHT ANKLE: Primary | ICD-10-CM

## 2025-07-29 PROCEDURE — 97530 THERAPEUTIC ACTIVITIES: CPT | Mod: PN,CQ

## 2025-07-29 PROCEDURE — 97112 NEUROMUSCULAR REEDUCATION: CPT | Mod: PN,CQ

## 2025-07-29 NOTE — PROGRESS NOTES
[facility]   Physical Therapy Treatment Note      Name: Morro Madrigal  Clinic Number: [id number]    Therapy Diagnosis:   Encounter Diagnoses   Name Primary?    Pain in lateral portion of right ankle Yes    Gait abnormality      Physician: Tucker Bender DPM    Visit Date: 7/29/2025    Physician Orders: PT Eval and Treat   Medical Diagnosis from Referral: M25.571 (ICD-10-CM) - Acute right ankle pain M25.373 (ICD-10-CM) - Instability of ankle, unspecified laterality  Evaluation Date: 7/24/2025  Authorization Period Expiration: pending  Plan of Care Expiration: 9/17/2025  Progress Note Due: 8/17/2025   Visit # / Visits authorized: 2/15   FOTO: 1/ 3     Precautions: Standard      Time In: 7:15 am   Time Out: 8:10 am   Total Billable Time: 55 minutes     PTA Visit #: 1/5       Subjective     Patient reports: she walked her dogs this morning, but feels good. States she had increased pain in anterior ankle with toe yoga when she lifts the toes. She also has increased discomfort with soleus stretch at end range.     She was compliant with home exercise program.  Response to previous treatment: good, eval   Functional change: ongoing     Pain: 2/10  Location: lateral R ankle     Objective      Objective Measures updated at progress report unless specified.     Treatment     Morro received the treatments listed below:      therapeutic exercises to develop strength, endurance, ROM, flexibility, posture, and core stabilization for 00 minutes including:  None today.     manual therapy techniques: Joint mobilizations, Myofacial release, Soft tissue Mobilization, and Friction Massage were applied to the:  for 00 minutes, including:  None today.     neuromuscular re-education activities to improve: Balance, Coordination, Kinesthetic, Sense, Proprioception, and Posture for 30 minutes. The following activities were included:  [place]  x 1 (inversion/eversion)   Wobble board (ML/AP) dynamic x 20,  "static x 1' ea   Tandem stance x 2 x 30" ea   MOBO board   No resistance x 10   Vs YTB x 20 ea direction   Seated DF stretch x 10 x 10" hold (added to HEP)     therapeutic activities to improve functional performance for 25  minutes, including:  Reviewed and educated pt on HEP:    Toe yoga x 20   Arch doming x 20   GS and soleus stretches 3 x 30" ea       Patient Education and Home Exercises       Education provided:   - Reviewed and educated pt on HEP; educated pt on three main points of the foot responsible for weight distribution, educated pt on LLLD stretching and exercising in pain free range.   - 7/29/2025 updated HEP: seated DF stretch     Written Home Exercises Provided: Pt instructed to continue prior HEP. Exercises were reviewed and Morro was able to demonstrate them prior to the end of the session.  Morro demonstrated good  understanding of the education provided. See Electronic Medical Record under Patient Instructions for exercises provided during therapy sessions    Assessment     Overall good tolerance with no adverse effects. Reviewed HEP and educated pt on load long duration stretch with stretches, which pt demo good understanding. Minimal verbal cues for exercise technique, which pt was able to correct when cued. Progressed balance, which pt was able to correct with ankle, hip and stepping strategies. Good training effects with MOBO resulting in good target muscle fatigue. Continue to monitor and progress pt as tolerated.     Morro Is progressing well towards her goals.   Patient prognosis is Good.     Patient will continue to benefit from skilled outpatient physical therapy to address the deficits listed in the problem list box on initial evaluation, provide pt/family education and to maximize pt's level of independence in the home and community environment.     Patient's spiritual, cultural and educational needs considered and pt agreeable to plan of care and goals.     Anticipated barriers to " physical therapy: standard     Goals: updated 07/29/2025   Short Term Goals (4 Weeks):   1. Patient to have improved B dorsiflexion PROM by 25% or greater for ease with ADL's such as stair descent (Progressing, not met)   2. Patient to have improved single limb balance as noted by 10 sec or greater ea LE for improved gait stability (Progressing, not met)   3. Patient to report decreased pain in R ankle by 20% or greater for independence with ADL's (Progressing, not met)      Long Term Goals (8 Weeks):   1. Patient to have improved subjective report of disability as noted by a score of 78% or greater on the FOTO ankle/foot questionnaire (Progressing, not met)   2. Patient to be independent with home exercise program for improved self management of condition(Progressing, not met)   3. Patient will have increased B ankle strength to 5/5 grossly for independence with ADL's such as squatting(Progressing, not met)   4. Patient will report gradual return to [place] and weight lifting, as well as initiate gradual return to jogging without pain exacerbation for return to PLOF. (Progressing, not met)      Plan      Plan of care Certification: 7/24/2025 to 9/17/2025.    Continue pt's current POC to reduce pain in R ankle/foot and improve strength, ROM, and stability in B LE.     Gisela Dalal, PTA

## 2025-07-29 NOTE — PROGRESS NOTES
Physical Therapist and Physical Therapist Assistant met face to face to discuss patient's treatment plan and progress towards established goals. Pt will be seen by a physical therapist minimally every 6th visit or every 30 days.    Gisela Dalal, KRISTIE  07/29/2025

## 2025-08-01 ENCOUNTER — CLINICAL SUPPORT (OUTPATIENT)
Dept: REHABILITATION | Facility: OTHER | Age: 60
End: 2025-08-01
Payer: COMMERCIAL

## 2025-08-01 DIAGNOSIS — R26.9 GAIT ABNORMALITY: ICD-10-CM

## 2025-08-01 DIAGNOSIS — M25.571 PAIN IN LATERAL PORTION OF RIGHT ANKLE: Primary | ICD-10-CM

## 2025-08-01 PROCEDURE — 97530 THERAPEUTIC ACTIVITIES: CPT | Mod: PN

## 2025-08-01 PROCEDURE — 97112 NEUROMUSCULAR REEDUCATION: CPT | Mod: PN

## 2025-08-01 NOTE — PROGRESS NOTES
[facility]   Physical Therapy Treatment Note      Name: Morro Madrigal  Clinic Number: [id number]    Therapy Diagnosis:   Encounter Diagnoses   Name Primary?    Pain in lateral portion of right ankle Yes    Gait abnormality      Physician: Tucker Bender DPM    Visit Date: 8/1/2025    Physician Orders: PT Eval and Treat   Medical Diagnosis from Referral: M25.571 (ICD-10-CM) - Acute right ankle pain M25.373 (ICD-10-CM) - Instability of ankle, unspecified laterality  Evaluation Date: 7/24/2025  Authorization Period Expiration: pending  Plan of Care Expiration: 9/17/2025  Progress Note Due: 8/17/2025   Visit # / Visits authorized: 3/15   FOTO: 1/ 3     Precautions: Standard      Time In: 1:00 pm   Time Out: 2:00 pm   Total Billable Time: 60 minutes     PTA Visit #: 1/5       Subjective     Patient reports: continued difficulty with toe yoga but says that lateral ankle pain is slowly improving. Continues to have AM stiffness and mild increase in pain to 2-3/10 if she's standing or walking too long.     She was compliant with home exercise program.  Response to previous treatment: good, eval   Functional change: ongoing     Pain: 2/10  Location: lateral R ankle     Objective      Objective Measures updated at progress report unless specified.     Treatment     Morro received the treatments listed below:      therapeutic exercises to develop strength, endurance, ROM, flexibility, posture, and core stabilization for 00 minutes including:  None today.     manual therapy techniques: Joint mobilizations, Myofacial release, Soft tissue Mobilization, and Friction Massage were applied to the:  for 00 minutes, including:  None today.     neuromuscular re-education activities to improve: Balance, Coordination, Kinesthetic, Sense, Proprioception, and Posture for 35 minutes. The following activities were included:  +elliptical: 5 minutes, lvl 1.0 (collected hx, c/c' circulation, ROM, jt  "lubrication)  +incline board gatroc stretch x2x 1'  +resisted 4-way ankle x3x10 ea (PF = Black TB, Inv/Ev/DF = RTB)    [place]  x 1 (inversion/eversion)   Wobble board (ML/AP) dynamic x 20, static x 1' ea -- DL today, limited ROM with dynamic mvt 2* increased pain over ATFL  Tandem stance x 2 x 30" ea   MOBO board   No resistance x 10   Vs YTB x 20 ea direction     +short arch seated x10x10"  +standing short arch x10x10"  +SLS short arch x10x10"    therapeutic activities to improve functional performance for 25  minutes, including:  +Squats with arch lifts: 1x10 w/ BW, 1x10 w/goblet hold (15# KB)  +Seated heel raise (over 1/2 foam) x2x10 x 20# KB -- tolerable ROM (c/o pain at endrange PF)  +side stepping x2x50 ft x orange loop at forefoot    Patient Education and Home Exercises       Education provided:   - Reviewed and educated pt on HEP; educated pt on three main points of the foot responsible for weight distribution, educated pt on LLLD stretching and exercising in pain free range.   - 7/29/2025 updated HEP: seated DF stretch     Written Home Exercises Provided: Pt instructed to continue prior HEP. Exercises were reviewed and Morro was able to demonstrate them prior to the end of the session.  Morro demonstrated good  understanding of the education provided. See Electronic Medical Record under Patient Instructions for exercises provided during therapy sessions    Assessment     Progressed ankle strength and proximal stability today. Mod fatigue in glutes w/ side stepping but able to perform w/o increased lateral ankle pain. Pt notes increased lateral discomfort over AFTL with endrange PF during seated heel raises and dynamic AP on the rocker board today.  Plan to update HEP nv.    Morro Is progressing well towards her goals.   Patient prognosis is Good.     Patient will continue to benefit from skilled outpatient physical therapy to address the deficits listed in the problem list box on initial evaluation, " provide pt/family education and to maximize pt's level of independence in the home and community environment.     Patient's spiritual, cultural and educational needs considered and pt agreeable to plan of care and goals.     Anticipated barriers to physical therapy: standard     Goals: updated 08/01/2025   Short Term Goals (4 Weeks):   1. Patient to have improved B dorsiflexion PROM by 25% or greater for ease with ADL's such as stair descent (Progressing, not met)   2. Patient to have improved single limb balance as noted by 10 sec or greater ea LE for improved gait stability (Progressing, not met)   3. Patient to report decreased pain in R ankle by 20% or greater for independence with ADL's (Progressing, not met)      Long Term Goals (8 Weeks):   1. Patient to have improved subjective report of disability as noted by a score of 78% or greater on the FOTO ankle/foot questionnaire (Progressing, not met)   2. Patient to be independent with home exercise program for improved self management of condition(Progressing, not met)   3. Patient will have increased B ankle strength to 5/5 grossly for independence with ADL's such as squatting(Progressing, not met)   4. Patient will report gradual return to [place] and weight lifting, as well as initiate gradual return to jogging without pain exacerbation for return to PLOF. (Progressing, not met)      Plan      Plan of care Certification: 7/24/2025 to 9/17/2025.    Continue pt's current POC to reduce pain in R ankle/foot and improve strength, ROM, and stability in B LE.     Cheryl Gloria, PT

## 2025-08-05 ENCOUNTER — CLINICAL SUPPORT (OUTPATIENT)
Dept: REHABILITATION | Facility: OTHER | Age: 60
End: 2025-08-05
Payer: COMMERCIAL

## 2025-08-05 DIAGNOSIS — R26.9 GAIT ABNORMALITY: ICD-10-CM

## 2025-08-05 DIAGNOSIS — M25.571 PAIN IN LATERAL PORTION OF RIGHT ANKLE: Primary | ICD-10-CM

## 2025-08-05 PROCEDURE — 97112 NEUROMUSCULAR REEDUCATION: CPT | Mod: PN | Performed by: PHYSICAL THERAPIST

## 2025-08-05 PROCEDURE — 97140 MANUAL THERAPY 1/> REGIONS: CPT | Mod: PN | Performed by: PHYSICAL THERAPIST

## 2025-08-05 NOTE — PROGRESS NOTES
[facility]   Physical Therapy Treatment Note      Name: Morro Madrigal  Clinic Number: [id number]    Therapy Diagnosis:   Encounter Diagnoses   Name Primary?    Pain in lateral portion of right ankle Yes    Gait abnormality      Physician: Tucker Bender DPM    Visit Date: 8/5/2025    Physician Orders: PT Eval and Treat   Medical Diagnosis from Referral: M25.571 (ICD-10-CM) - Acute right ankle pain M25.373 (ICD-10-CM) - Instability of ankle, unspecified laterality  Evaluation Date: 7/24/2025  Authorization Period Expiration: pending  Plan of Care Expiration: 9/17/2025  Progress Note Due: 8/17/2025   Visit # / Visits authorized: 3/15   FOTO: 1/ 3     Precautions: Standard      Time In: 0715  Time Out: 0815   Total Billable Time: 60 minutes     PTA Visit #: 0/5       Subjective     Patient reports: feeling a little frustrated, still gets a lot of tension around the ankle, particularly towards the end of the day. Still has some discomfort to lateral ankle with toe yoga, but doesn't stop her from doing the exercise.     She was compliant with home exercise program.  Response to previous treatment: good, eval   Functional change: ongoing     Pain: 2/10  Location: lateral R ankle     Objective      Objective Measures updated at progress report unless specified.     Treatment     Morro received the treatments listed below:      therapeutic exercises to develop strength, endurance, ROM, flexibility, posture, and core stabilization for 00 minutes including:  None today.     manual therapy techniques: Joint mobilizations, Myofacial release, Soft tissue Mobilization, and Friction Massage were applied to the:  for 15 minutes, including:  IASTM to R ankle with vacuum cups and myofascial tool. Skin monitored throughout treatment  Manual cross friction massage. Joint mobilizations to improve dorsiflexion    neuromuscular re-education activities to improve: Balance, Coordination, Kinesthetic, Sense,  "Proprioception, and Posture for 45 minutes. The following activities were included:  +elliptical: 5 minutes, lvl 1.0 (collected hx, c/c' circulation, ROM, jt lubrication)  +incline board gatroc stretch x2x 1'  +resisted 4-way ankle x3x10 ea (PF = Black TB, Inv/Ev/DF = RTB)    [place]  x 1 (inversion/eversion)   Wobble board (ML/AP) dynamic x 20, static x 1' ea -- DL today, limited ROM with dynamic mvt 2* increased pain over ATFL  +uni wobble board 3 x 30" ea forefoot/rearfoot  Tandem stance x 2 x 30" ea   MOBO board   No resistance x 10   Vs YTB x 20 ea direction     +short arch seated x10x10"  +standing short arch x10x10"  +SLS short arch x10x10"  +B HR with yellow ball btw heels x 30 (attempted eccentric, pain to lateral ankle)    therapeutic activities to improve functional performance for 00  minutes, including:  +Squats with arch lifts: 1x10 w/ BW, 1x10 w/goblet hold (15# KB)  +Seated heel raise (over 1/2 foam) x2x10 x 20# KB -- tolerable ROM (c/o pain at endrange PF)  +side stepping x2x50 ft x orange loop at forefoot    Patient Education and Home Exercises       Education provided:   - Reviewed and educated pt on HEP; educated pt on three main points of the foot responsible for weight distribution, educated pt on LLLD stretching and exercising in pain free range.   - 7/29/2025 updated HEP: seated DF stretch     Written Home Exercises Provided: Pt instructed to continue prior HEP. Exercises were reviewed and Morro was able to demonstrate them prior to the end of the session.  Morro demonstrated good  understanding of the education provided. See Electronic Medical Record under Patient Instructions for exercises provided during therapy sessions    Assessment     Good tolerance to treatment today. Pt reports continued tension to anterior and lateral ankle, worse with progression of the day. Initiated soft tissue and joint mobilizations today. Noted marked soft tissue restriction along lateral structures, " good tolerance to interventions. Pt educated on use of silicone cups for home as an option. Good tolerance to therex today, recommend resuming dynamic activities as tolerated at next visit    Morro Is progressing well towards her goals.   Patient prognosis is Good.     Patient will continue to benefit from skilled outpatient physical therapy to address the deficits listed in the problem list box on initial evaluation, provide pt/family education and to maximize pt's level of independence in the home and community environment.     Patient's spiritual, cultural and educational needs considered and pt agreeable to plan of care and goals.     Anticipated barriers to physical therapy: standard     Goals: updated 08/05/2025   Short Term Goals (4 Weeks):   1. Patient to have improved B dorsiflexion PROM by 25% or greater for ease with ADL's such as stair descent (Progressing, not met)   2. Patient to have improved single limb balance as noted by 10 sec or greater ea LE for improved gait stability (Progressing, not met)   3. Patient to report decreased pain in R ankle by 20% or greater for independence with ADL's (Progressing, not met)      Long Term Goals (8 Weeks):   1. Patient to have improved subjective report of disability as noted by a score of 78% or greater on the FOTO ankle/foot questionnaire (Progressing, not met)   2. Patient to be independent with home exercise program for improved self management of condition(Progressing, not met)   3. Patient will have increased B ankle strength to 5/5 grossly for independence with ADL's such as squatting(Progressing, not met)   4. Patient will report gradual return to [place] and weight lifting, as well as initiate gradual return to jogging without pain exacerbation for return to PLOF. (Progressing, not met)      Plan      Plan of care Certification: 7/24/2025 to 9/17/2025.    Continue pt's current POC to reduce pain in R ankle/foot and improve strength, ROM, and stability in  [place].     Mine Carl, PT, DPT, OCS

## 2025-08-07 ENCOUNTER — CLINICAL SUPPORT (OUTPATIENT)
Dept: REHABILITATION | Facility: OTHER | Age: 60
End: 2025-08-07
Payer: COMMERCIAL

## 2025-08-07 DIAGNOSIS — M25.571 PAIN IN LATERAL PORTION OF RIGHT ANKLE: Primary | ICD-10-CM

## 2025-08-07 DIAGNOSIS — R26.9 GAIT ABNORMALITY: ICD-10-CM

## 2025-08-07 PROCEDURE — 97112 NEUROMUSCULAR REEDUCATION: CPT | Mod: PN | Performed by: PHYSICAL THERAPIST

## 2025-08-07 PROCEDURE — 97140 MANUAL THERAPY 1/> REGIONS: CPT | Mod: PN | Performed by: PHYSICAL THERAPIST

## 2025-08-07 PROCEDURE — 97530 THERAPEUTIC ACTIVITIES: CPT | Mod: PN | Performed by: PHYSICAL THERAPIST

## 2025-08-07 NOTE — PROGRESS NOTES
Outpatient Rehab    Physical Therapy Visit    Patient Name: Morro Madrigal  MRN: [id number]  YOB: 1965  Encounter Date: 8/7/2025    Therapy Diagnosis:   Encounter Diagnoses   Name Primary?    Pain in lateral portion of right ankle Yes    Gait abnormality      Physician: Tucker Bender DPM    Physician Orders: Eval and Treat  Medical Diagnosis: Acute right ankle pain  Instability of ankle, unspecified laterality  Surgical Diagnosis: Not applicable for this Episode   Surgical Date: Not applicable for this Episode  Days Since Last Surgery: Not applicable for this Episode    Visit # / Visits Authorized:  4 / 15  Insurance Authorization Period: 7/24/2025 to 12/31/2025  Date of Evaluation: 7/24/2025  Plan of Care Certification: 7/24/2025 to 9/18/2025      PT/PTA:     Number of PTA visits since last PT visit:   Time In: 0715   Time Out: 0815  Total Time (in minutes): 60   Total Billable Time (in minutes): 60      PTA Visit #: 0/5       Subjective     Patient reports: feeling some improvement with cupping.      She was compliant with home exercise program.  Response to previous treatment: good improvement with manual therapy  Functional change: ongoing     Pain: 2/10  Location: lateral R ankle     Objective      Objective Measures updated at progress report unless specified.     Treatment     Morro received the treatments listed below:      therapeutic exercises to develop strength, endurance, ROM, flexibility, posture, and core stabilization for 00 minutes including:  None today.     manual therapy techniques: Joint mobilizations, Myofacial release, Soft tissue Mobilization, and Friction Massage were applied to the:  for 20 minutes, including:  IASTM to R ankle with vacuum cups and myofascial tool. Skin monitored throughout treatment  Manual cross friction massage. Joint mobilizations to improve dorsiflexion    neuromuscular re-education activities to improve: Balance, Coordination, Kinesthetic, Sense,  "Proprioception, and Posture for 25 minutes. The following activities were included:  elliptical: 5 minutes, lvl 1.0 (collected hx, c/c' circulation, ROM, jt lubrication)  incline board gatroc stretch x2x 1'  +resisted 4-way ankle x3x10 ea (PF = Black TB, Inv/Ev/DF = RTB)    [place]  x 1 (inversion/eversion)   Wobble board (ML/AP) dynamic x 20, static x 1' ea -- DL today, limited ROM with dynamic mvt 2* increased pain over ATFL  uni wobble board 3 x 30" ea forefoot/rearfoot  Tandem stance x 2 x 30" ea   MOBO board   No resistance x 10   Vs YTB x 20 ea direction     short arch seated x10x10"  standing short arch x10x10"  SLS short arch x10x10"  B HR with yellow ball btw heels x 30 (attempted eccentric, pain to lateral ankle)    therapeutic activities to improve functional performance for 15  minutes, including:  +Squats with arch lifts: 1x10 w/ BW, 1x10 w/goblet hold (15# KB)  +Seated heel raise (over 1/2 foam) x2x10 x 20# KB -- tolerable ROM (c/o pain at endrange PF)  +hesitation march 2 x 50 ft  +ankle flip 2 x 50 ft  side stepping x2x50 ft x orange loop at forefoot    Patient Education and Home Exercises       Education provided:   - Reviewed and educated pt on HEP; educated pt on three main points of the foot responsible for weight distribution, educated pt on LLLD stretching and exercising in pain free range.   - 7/29/2025 updated HEP: seated DF stretch     Written Home Exercises Provided: Pt instructed to continue prior HEP. Exercises were reviewed and Morro was able to demonstrate them prior to the end of the session.  Morro demonstrated good  understanding of the education provided. See Electronic Medical Record under Patient Instructions for exercises provided during therapy sessions    Assessment     Good tolerance to treatment today. Manual therapy resumed as pt notes good improvement from prior visit. Progression of dynamic activities today, min difficulty maintaining balance, but able to " self-correct independently. Mild fatigue with resisted side-stepping but able to complete.     Morro Is progressing well towards her goals.   Patient prognosis is Good.     Patient will continue to benefit from skilled outpatient physical therapy to address the deficits listed in the problem list box on initial evaluation, provide pt/family education and to maximize pt's level of independence in the home and community environment.     Patient's spiritual, cultural and educational needs considered and pt agreeable to plan of care and goals.     Anticipated barriers to physical therapy: standard     Goals: updated 08/07/2025   Short Term Goals (4 Weeks):   1. Patient to have improved B dorsiflexion PROM by 25% or greater for ease with ADL's such as stair descent (Progressing, not met)   2. Patient to have improved single limb balance as noted by 10 sec or greater ea LE for improved gait stability (Progressing, not met)   3. Patient to report decreased pain in R ankle by 20% or greater for independence with ADL's (Progressing, not met)      Long Term Goals (8 Weeks):   1. Patient to have improved subjective report of disability as noted by a score of 78% or greater on the FOTO ankle/foot questionnaire (Progressing, not met)   2. Patient to be independent with home exercise program for improved self management of condition(Progressing, not met)   3. Patient will have increased B ankle strength to 5/5 grossly for independence with ADL's such as squatting(Progressing, not met)   4. Patient will report gradual return to [place] and weight lifting, as well as initiate gradual return to jogging without pain exacerbation for return to PLOF. (Progressing, not met)      Plan      Plan of care Certification: 7/24/2025 to 9/17/2025.    Continue pt's current POC to reduce pain in R ankle/foot and improve strength, ROM, and stability in B LE.     Mine Carl, PT, DPT, OCS

## 2025-08-12 ENCOUNTER — CLINICAL SUPPORT (OUTPATIENT)
Dept: REHABILITATION | Facility: OTHER | Age: 60
End: 2025-08-12
Payer: COMMERCIAL

## 2025-08-12 DIAGNOSIS — R26.9 GAIT ABNORMALITY: ICD-10-CM

## 2025-08-12 DIAGNOSIS — M25.571 PAIN IN LATERAL PORTION OF RIGHT ANKLE: Primary | ICD-10-CM

## 2025-08-12 PROCEDURE — 97530 THERAPEUTIC ACTIVITIES: CPT | Mod: PN,CQ

## 2025-08-12 PROCEDURE — 97110 THERAPEUTIC EXERCISES: CPT | Mod: PN,CQ

## 2025-08-12 PROCEDURE — 97112 NEUROMUSCULAR REEDUCATION: CPT | Mod: PN,CQ

## 2025-08-14 ENCOUNTER — CLINICAL SUPPORT (OUTPATIENT)
Dept: REHABILITATION | Facility: OTHER | Age: 60
End: 2025-08-14
Payer: COMMERCIAL

## 2025-08-14 DIAGNOSIS — M25.571 PAIN IN LATERAL PORTION OF RIGHT ANKLE: Primary | ICD-10-CM

## 2025-08-14 DIAGNOSIS — R26.9 GAIT ABNORMALITY: ICD-10-CM

## 2025-08-14 PROCEDURE — 97140 MANUAL THERAPY 1/> REGIONS: CPT | Mod: PN | Performed by: PHYSICAL THERAPIST

## 2025-08-14 PROCEDURE — 97112 NEUROMUSCULAR REEDUCATION: CPT | Mod: PN | Performed by: PHYSICAL THERAPIST

## 2025-08-14 PROCEDURE — 97530 THERAPEUTIC ACTIVITIES: CPT | Mod: PN | Performed by: PHYSICAL THERAPIST

## 2025-08-18 ENCOUNTER — PATIENT MESSAGE (OUTPATIENT)
Dept: REHABILITATION | Facility: OTHER | Age: 60
End: 2025-08-18
Payer: COMMERCIAL

## 2025-08-19 ENCOUNTER — TELEPHONE (OUTPATIENT)
Dept: OPTOMETRY | Facility: CLINIC | Age: 60
End: 2025-08-19
Payer: COMMERCIAL

## 2025-08-20 ENCOUNTER — CLINICAL SUPPORT (OUTPATIENT)
Dept: REHABILITATION | Facility: OTHER | Age: 60
End: 2025-08-20
Payer: COMMERCIAL

## 2025-08-20 DIAGNOSIS — R26.9 GAIT ABNORMALITY: ICD-10-CM

## 2025-08-20 DIAGNOSIS — M25.571 PAIN IN LATERAL PORTION OF RIGHT ANKLE: Primary | ICD-10-CM

## 2025-08-20 PROCEDURE — 97530 THERAPEUTIC ACTIVITIES: CPT | Mod: PN,CQ

## 2025-08-20 PROCEDURE — 97112 NEUROMUSCULAR REEDUCATION: CPT | Mod: PN,CQ

## 2025-08-20 PROCEDURE — 97140 MANUAL THERAPY 1/> REGIONS: CPT | Mod: PN,CQ

## 2025-08-21 ENCOUNTER — OFFICE VISIT (OUTPATIENT)
Dept: OPTOMETRY | Facility: CLINIC | Age: 60
End: 2025-08-21
Payer: COMMERCIAL

## 2025-08-21 DIAGNOSIS — H25.13 SENILE NUCLEAR SCLEROSIS, BILATERAL: Primary | ICD-10-CM

## 2025-08-21 DIAGNOSIS — H52.03 HYPEROPIA WITH ASTIGMATISM AND PRESBYOPIA, BILATERAL: ICD-10-CM

## 2025-08-21 DIAGNOSIS — H52.4 HYPEROPIA WITH ASTIGMATISM AND PRESBYOPIA, BILATERAL: ICD-10-CM

## 2025-08-21 DIAGNOSIS — H52.203 HYPEROPIA WITH ASTIGMATISM AND PRESBYOPIA, BILATERAL: ICD-10-CM

## 2025-08-21 PROCEDURE — 99999 PR PBB SHADOW E&M-EST. PATIENT-LVL II: CPT | Mod: PBBFAC,,, | Performed by: OPTOMETRIST

## 2025-08-26 ENCOUNTER — CLINICAL SUPPORT (OUTPATIENT)
Dept: REHABILITATION | Facility: OTHER | Age: 60
End: 2025-08-26
Payer: COMMERCIAL

## 2025-08-26 DIAGNOSIS — M25.571 PAIN IN LATERAL PORTION OF RIGHT ANKLE: Primary | ICD-10-CM

## 2025-08-26 DIAGNOSIS — R26.9 GAIT ABNORMALITY: ICD-10-CM

## 2025-08-26 PROCEDURE — 97530 THERAPEUTIC ACTIVITIES: CPT | Mod: PN,CQ

## 2025-08-26 PROCEDURE — 97112 NEUROMUSCULAR REEDUCATION: CPT | Mod: PN,CQ

## 2025-08-26 PROCEDURE — 97110 THERAPEUTIC EXERCISES: CPT | Mod: PN,CQ
